# Patient Record
Sex: MALE | Race: OTHER
[De-identification: names, ages, dates, MRNs, and addresses within clinical notes are randomized per-mention and may not be internally consistent; named-entity substitution may affect disease eponyms.]

---

## 2020-06-10 ENCOUNTER — HOSPITAL ENCOUNTER (INPATIENT)
Dept: HOSPITAL 54 - ER | Age: 70
LOS: 14 days | Discharge: NURSING, REHAB OR LONG-TERM CARE | DRG: 870 | End: 2020-06-24
Attending: INTERNAL MEDICINE | Admitting: INTERNAL MEDICINE
Payer: SELF-PAY

## 2020-06-10 VITALS — SYSTOLIC BLOOD PRESSURE: 80 MMHG | DIASTOLIC BLOOD PRESSURE: 33 MMHG

## 2020-06-10 VITALS — DIASTOLIC BLOOD PRESSURE: 38 MMHG | SYSTOLIC BLOOD PRESSURE: 86 MMHG

## 2020-06-10 VITALS — SYSTOLIC BLOOD PRESSURE: 86 MMHG | DIASTOLIC BLOOD PRESSURE: 51 MMHG

## 2020-06-10 VITALS — DIASTOLIC BLOOD PRESSURE: 48 MMHG | SYSTOLIC BLOOD PRESSURE: 88 MMHG

## 2020-06-10 VITALS — WEIGHT: 186 LBS | HEIGHT: 70 IN | BODY MASS INDEX: 26.63 KG/M2

## 2020-06-10 VITALS — SYSTOLIC BLOOD PRESSURE: 95 MMHG | DIASTOLIC BLOOD PRESSURE: 64 MMHG

## 2020-06-10 VITALS — SYSTOLIC BLOOD PRESSURE: 112 MMHG | DIASTOLIC BLOOD PRESSURE: 50 MMHG

## 2020-06-10 DIAGNOSIS — T38.0X5A: ICD-10-CM

## 2020-06-10 DIAGNOSIS — L89.106: ICD-10-CM
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
## 2020-06-10 DIAGNOSIS — E43: ICD-10-CM

## 2020-06-10 DIAGNOSIS — D50.9: ICD-10-CM

## 2020-06-10 DIAGNOSIS — L89.316: ICD-10-CM

## 2020-06-10 DIAGNOSIS — L89.626: ICD-10-CM

## 2020-06-10 DIAGNOSIS — S70.221A: ICD-10-CM

## 2020-06-10 DIAGNOSIS — E87.6: ICD-10-CM

## 2020-06-10 DIAGNOSIS — L89.326: ICD-10-CM

## 2020-06-10 DIAGNOSIS — J98.11: ICD-10-CM

## 2020-06-10 DIAGNOSIS — I87.2: ICD-10-CM

## 2020-06-10 DIAGNOSIS — J96.01: ICD-10-CM

## 2020-06-10 DIAGNOSIS — D63.1: ICD-10-CM

## 2020-06-10 DIAGNOSIS — I89.0: ICD-10-CM

## 2020-06-10 DIAGNOSIS — L89.216: ICD-10-CM

## 2020-06-10 DIAGNOSIS — N17.0: ICD-10-CM

## 2020-06-10 DIAGNOSIS — I87.8: ICD-10-CM

## 2020-06-10 DIAGNOSIS — L89.896: ICD-10-CM

## 2020-06-10 DIAGNOSIS — B96.89: ICD-10-CM

## 2020-06-10 DIAGNOSIS — B95.2: ICD-10-CM

## 2020-06-10 DIAGNOSIS — L89.156: ICD-10-CM

## 2020-06-10 DIAGNOSIS — L89.616: ICD-10-CM

## 2020-06-10 DIAGNOSIS — J18.9: ICD-10-CM

## 2020-06-10 DIAGNOSIS — R65.21: ICD-10-CM
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
## 2020-06-10 DIAGNOSIS — E87.0: ICD-10-CM

## 2020-06-10 DIAGNOSIS — G92: ICD-10-CM

## 2020-06-10 DIAGNOSIS — R23.8: ICD-10-CM

## 2020-06-10 DIAGNOSIS — D63.8: ICD-10-CM

## 2020-06-10 DIAGNOSIS — A41.9: Primary | ICD-10-CM

## 2020-06-10 DIAGNOSIS — E87.2: ICD-10-CM

## 2020-06-10 DIAGNOSIS — N18.9: ICD-10-CM

## 2020-06-10 DIAGNOSIS — J96.02: ICD-10-CM
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
## 2020-06-10 DIAGNOSIS — Z59.0: ICD-10-CM

## 2020-06-10 LAB
ALBUMIN SERPL BCP-MCNC: 2 G/DL (ref 3.4–5)
ALP SERPL-CCNC: 76 U/L (ref 46–116)
ALT SERPL W P-5'-P-CCNC: 19 U/L (ref 12–78)
AST SERPL W P-5'-P-CCNC: 28 U/L (ref 15–37)
BASE EXCESS BLDA CALC-SCNC: -7.3 MMOL/L
BASOPHILS # BLD AUTO: 0 /CMM (ref 0–0.2)
BASOPHILS NFR BLD AUTO: 0.2 % (ref 0–2)
BILIRUB DIRECT SERPL-MCNC: 0.4 MG/DL (ref 0–0.2)
BILIRUB SERPL-MCNC: 0.9 MG/DL (ref 0.2–1)
BILIRUB UR QL STRIP: (no result)
BUN SERPL-MCNC: 43 MG/DL (ref 7–18)
CALCIUM SERPL-MCNC: 8.5 MG/DL (ref 8.5–10.1)
CHLORIDE SERPL-SCNC: 108 MMOL/L (ref 98–107)
CK SERPL-CCNC: 369 U/L (ref 39–308)
CO2 SERPL-SCNC: 20 MMOL/L (ref 21–32)
CREAT SERPL-MCNC: 1.6 MG/DL (ref 0.6–1.3)
CRP SERPL-MCNC: 21.9 MG/DL (ref 0–0.9)
D DIMER PPP FEU-MCNC: 3.2 MG/L(FEU (ref 0.17–0.5)
DO-HGB MFR BLDA: 185.2 MMHG
EOSINOPHIL NFR BLD AUTO: 0 % (ref 0–6)
FERRITIN SERPL-MCNC: 137 NG/ML (ref 8–388)
GLUCOSE SERPL-MCNC: 169 MG/DL (ref 74–106)
HCT VFR BLD AUTO: 25 % (ref 39–51)
HGB BLD-MCNC: 8 G/DL (ref 13.5–17.5)
INHALED O2 CONCENTRATION: 100 %
KETONES UR STRIP-MCNC: 15 MG/DL
LYMPHOCYTES NFR BLD AUTO: 0.4 /CMM (ref 0.8–4.8)
LYMPHOCYTES NFR BLD AUTO: 3.3 % (ref 20–44)
LYMPHOCYTES NFR BLD MANUAL: 2 % (ref 16–48)
MCHC RBC AUTO-ENTMCNC: 31 G/DL (ref 31–36)
MCV RBC AUTO: 70 FL (ref 80–96)
MONOCYTES NFR BLD AUTO: 1.1 /CMM (ref 0.1–1.3)
MONOCYTES NFR BLD AUTO: 9.7 % (ref 2–12)
MONOCYTES NFR BLD MANUAL: 10 % (ref 0–11)
NEUTROPHILS # BLD AUTO: 10.1 /CMM (ref 1.8–8.9)
NEUTROPHILS NFR BLD AUTO: 86.8 % (ref 43–81)
NEUTS BAND NFR BLD MANUAL: 8 % (ref 0–5)
NEUTS SEG NFR BLD MANUAL: 80 % (ref 42–76)
NT-PROBNP SERPL-MCNC: 1833 PG/ML (ref 0–125)
PCO2 TEMP ADJ BLDA: 50.1 MMHG (ref 35–45)
PH TEMP ADJ BLDA: 7.22 [PH] (ref 7.35–7.45)
PH UR STRIP: 5.5 [PH] (ref 5–8)
PLATELET # BLD AUTO: 414 /CMM (ref 150–450)
PO2 TEMP ADJ BLDA: 477.7 MMHG (ref 75–100)
POTASSIUM SERPL-SCNC: 4.1 MMOL/L (ref 3.5–5.1)
PROT SERPL-MCNC: 7.1 G/DL (ref 6.4–8.2)
PROT UR QL STRIP: 100 MG/DL
RBC # BLD AUTO: 3.64 MIL/UL (ref 4.5–6)
RBC #/AREA URNS HPF: (no result) /HPF (ref 0–2)
SAO2 % BLDA: 99.6 % (ref 92–98.5)
SODIUM SERPL-SCNC: 140 MMOL/L (ref 136–145)
UROBILINOGEN UR STRIP-MCNC: 1 EU/DL
VENTILATION MODE VENT: (no result)
WBC #/AREA URNS HPF: (no result) /HPF (ref 0–3)
WBC NRBC COR # BLD AUTO: 11.6 K/UL (ref 4.3–11)

## 2020-06-10 PROCEDURE — G0378 HOSPITAL OBSERVATION PER HR: HCPCS

## 2020-06-10 PROCEDURE — A6403 STERILE GAUZE>16 <= 48 SQ IN: HCPCS

## 2020-06-10 PROCEDURE — A6253 ABSORPT DRG > 48 SQ IN W/O B: HCPCS

## 2020-06-10 PROCEDURE — C1750 CATH, HEMODIALYSIS,LONG-TERM: HCPCS

## 2020-06-10 PROCEDURE — 5A1955Z RESPIRATORY VENTILATION, GREATER THAN 96 CONSECUTIVE HOURS: ICD-10-PCS | Performed by: INTERNAL MEDICINE

## 2020-06-10 PROCEDURE — 0BH17EZ INSERTION OF ENDOTRACHEAL AIRWAY INTO TRACHEA, VIA NATURAL OR ARTIFICIAL OPENING: ICD-10-PCS | Performed by: EMERGENCY MEDICINE

## 2020-06-10 PROCEDURE — C9113 INJ PANTOPRAZOLE SODIUM, VIA: HCPCS

## 2020-06-10 PROCEDURE — A4216 STERILE WATER/SALINE, 10 ML: HCPCS

## 2020-06-10 RX ADMIN — PIPERACILLIN SODIUM AND TAZOBACTAM SODIUM SCH MLS/HR: .375; 3 INJECTION, POWDER, LYOPHILIZED, FOR SOLUTION INTRAVENOUS at 23:23

## 2020-06-10 SDOH — ECONOMIC STABILITY - HOUSING INSECURITY: HOMELESSNESS: Z59.0

## 2020-06-10 NOTE — NUR
RECEIVED PATIENT FROM ER< ORALLY INTUBATED ON AC MODE,sedated on Propofol drip (received  
from ER @ 5 mck/kg/min)responds to pain,slightly moves /wiggles both lower extremities,moves 
both upper extremities,maintain on bilateral soft wrist restraints.

Both lower extremities with diffuse maggot infested wound,edematous with thickened 
skin(greater /more on the left leg leg).CLEANSE both legs with NSS.

Noted a lot of blood underneath the patient when turned,(not sure where the bleeding is 
from,when checked no bleeding noted coming from the rectum,will observe the source of 
bleeding next time it happens.

## 2020-06-10 NOTE — NUR
BIB RA 86 FROM AN ALLEY, DECREASED LOC, HOT TO TOUCH, TACHYPNEIC. PT NON 
VERBAL. WOUND ON EDER LEG LOWER LEG INFECTED & MAGGOTS CRAWLING. PT SEEN & 
EVAL'D BY DR. MADRIGAL. PLACED ON CARDIAC MONITOR. ST. PT INTUBATED & PLACED ON 
VENT. MEDICATED AS ORDERED PER ERMD. WILL CONT TO MONITOR.

## 2020-06-10 NOTE — NUR
RT



rt called to er bed 5 for intubation. pt intubated with size 7.5 ett at 22cm at lip. vent 
settings are: 

AC 16 500 100% +5. no secretions were suctioned via ett. pt has diminished lung sounds. no 
complications during intubation procedure.



abg in 1 hr.



will continue to monitor

## 2020-06-10 NOTE — NUR
PROPOFOL DRIP INITIATED, PT FÁTIMA WELL. PT STABLE, RR EVEN & UNLABORED. NAD NOTED 
AT THIS TIME. WILL CONT TO MONITOR.

## 2020-06-10 NOTE — NUR
RT



pt transported to ct and back. no complications. pt placed back on vent with current 
settings upon return

## 2020-06-11 VITALS — SYSTOLIC BLOOD PRESSURE: 101 MMHG | DIASTOLIC BLOOD PRESSURE: 54 MMHG

## 2020-06-11 VITALS — DIASTOLIC BLOOD PRESSURE: 47 MMHG | SYSTOLIC BLOOD PRESSURE: 93 MMHG

## 2020-06-11 VITALS — SYSTOLIC BLOOD PRESSURE: 90 MMHG | DIASTOLIC BLOOD PRESSURE: 53 MMHG

## 2020-06-11 VITALS — DIASTOLIC BLOOD PRESSURE: 58 MMHG | SYSTOLIC BLOOD PRESSURE: 100 MMHG

## 2020-06-11 VITALS — SYSTOLIC BLOOD PRESSURE: 98 MMHG | DIASTOLIC BLOOD PRESSURE: 54 MMHG

## 2020-06-11 VITALS — DIASTOLIC BLOOD PRESSURE: 54 MMHG | SYSTOLIC BLOOD PRESSURE: 100 MMHG

## 2020-06-11 VITALS — DIASTOLIC BLOOD PRESSURE: 45 MMHG | SYSTOLIC BLOOD PRESSURE: 90 MMHG

## 2020-06-11 VITALS — SYSTOLIC BLOOD PRESSURE: 108 MMHG | DIASTOLIC BLOOD PRESSURE: 54 MMHG

## 2020-06-11 VITALS — SYSTOLIC BLOOD PRESSURE: 102 MMHG | DIASTOLIC BLOOD PRESSURE: 57 MMHG

## 2020-06-11 VITALS — SYSTOLIC BLOOD PRESSURE: 96 MMHG | DIASTOLIC BLOOD PRESSURE: 57 MMHG

## 2020-06-11 VITALS — SYSTOLIC BLOOD PRESSURE: 95 MMHG | DIASTOLIC BLOOD PRESSURE: 52 MMHG

## 2020-06-11 VITALS — SYSTOLIC BLOOD PRESSURE: 105 MMHG | DIASTOLIC BLOOD PRESSURE: 44 MMHG

## 2020-06-11 VITALS — DIASTOLIC BLOOD PRESSURE: 57 MMHG | SYSTOLIC BLOOD PRESSURE: 100 MMHG

## 2020-06-11 VITALS — DIASTOLIC BLOOD PRESSURE: 54 MMHG | SYSTOLIC BLOOD PRESSURE: 103 MMHG

## 2020-06-11 VITALS — DIASTOLIC BLOOD PRESSURE: 58 MMHG | SYSTOLIC BLOOD PRESSURE: 101 MMHG

## 2020-06-11 VITALS — DIASTOLIC BLOOD PRESSURE: 45 MMHG | SYSTOLIC BLOOD PRESSURE: 86 MMHG

## 2020-06-11 VITALS — SYSTOLIC BLOOD PRESSURE: 99 MMHG | DIASTOLIC BLOOD PRESSURE: 55 MMHG

## 2020-06-11 VITALS — DIASTOLIC BLOOD PRESSURE: 49 MMHG | SYSTOLIC BLOOD PRESSURE: 105 MMHG

## 2020-06-11 VITALS — SYSTOLIC BLOOD PRESSURE: 98 MMHG | DIASTOLIC BLOOD PRESSURE: 56 MMHG

## 2020-06-11 VITALS — DIASTOLIC BLOOD PRESSURE: 56 MMHG | SYSTOLIC BLOOD PRESSURE: 104 MMHG

## 2020-06-11 VITALS — SYSTOLIC BLOOD PRESSURE: 110 MMHG | DIASTOLIC BLOOD PRESSURE: 55 MMHG

## 2020-06-11 VITALS — SYSTOLIC BLOOD PRESSURE: 98 MMHG | DIASTOLIC BLOOD PRESSURE: 51 MMHG

## 2020-06-11 VITALS — DIASTOLIC BLOOD PRESSURE: 55 MMHG | SYSTOLIC BLOOD PRESSURE: 95 MMHG

## 2020-06-11 VITALS — SYSTOLIC BLOOD PRESSURE: 101 MMHG | DIASTOLIC BLOOD PRESSURE: 52 MMHG

## 2020-06-11 VITALS — SYSTOLIC BLOOD PRESSURE: 94 MMHG | DIASTOLIC BLOOD PRESSURE: 54 MMHG

## 2020-06-11 VITALS — SYSTOLIC BLOOD PRESSURE: 90 MMHG | DIASTOLIC BLOOD PRESSURE: 48 MMHG

## 2020-06-11 VITALS — DIASTOLIC BLOOD PRESSURE: 45 MMHG | SYSTOLIC BLOOD PRESSURE: 81 MMHG

## 2020-06-11 VITALS — DIASTOLIC BLOOD PRESSURE: 54 MMHG | SYSTOLIC BLOOD PRESSURE: 101 MMHG

## 2020-06-11 VITALS — DIASTOLIC BLOOD PRESSURE: 58 MMHG | SYSTOLIC BLOOD PRESSURE: 102 MMHG

## 2020-06-11 VITALS — DIASTOLIC BLOOD PRESSURE: 54 MMHG | SYSTOLIC BLOOD PRESSURE: 98 MMHG

## 2020-06-11 VITALS — DIASTOLIC BLOOD PRESSURE: 60 MMHG | SYSTOLIC BLOOD PRESSURE: 109 MMHG

## 2020-06-11 VITALS — DIASTOLIC BLOOD PRESSURE: 60 MMHG | SYSTOLIC BLOOD PRESSURE: 117 MMHG

## 2020-06-11 VITALS — SYSTOLIC BLOOD PRESSURE: 99 MMHG | DIASTOLIC BLOOD PRESSURE: 53 MMHG

## 2020-06-11 VITALS — DIASTOLIC BLOOD PRESSURE: 45 MMHG | SYSTOLIC BLOOD PRESSURE: 85 MMHG

## 2020-06-11 VITALS — SYSTOLIC BLOOD PRESSURE: 102 MMHG | DIASTOLIC BLOOD PRESSURE: 58 MMHG

## 2020-06-11 VITALS — DIASTOLIC BLOOD PRESSURE: 56 MMHG | SYSTOLIC BLOOD PRESSURE: 98 MMHG

## 2020-06-11 VITALS — SYSTOLIC BLOOD PRESSURE: 116 MMHG | DIASTOLIC BLOOD PRESSURE: 60 MMHG

## 2020-06-11 VITALS — SYSTOLIC BLOOD PRESSURE: 99 MMHG | DIASTOLIC BLOOD PRESSURE: 47 MMHG

## 2020-06-11 VITALS — SYSTOLIC BLOOD PRESSURE: 81 MMHG | DIASTOLIC BLOOD PRESSURE: 41 MMHG

## 2020-06-11 VITALS — DIASTOLIC BLOOD PRESSURE: 56 MMHG | SYSTOLIC BLOOD PRESSURE: 101 MMHG

## 2020-06-11 VITALS — DIASTOLIC BLOOD PRESSURE: 56 MMHG | SYSTOLIC BLOOD PRESSURE: 108 MMHG

## 2020-06-11 VITALS — SYSTOLIC BLOOD PRESSURE: 93 MMHG | DIASTOLIC BLOOD PRESSURE: 54 MMHG

## 2020-06-11 VITALS — SYSTOLIC BLOOD PRESSURE: 102 MMHG | DIASTOLIC BLOOD PRESSURE: 56 MMHG

## 2020-06-11 VITALS — DIASTOLIC BLOOD PRESSURE: 53 MMHG | SYSTOLIC BLOOD PRESSURE: 99 MMHG

## 2020-06-11 VITALS — SYSTOLIC BLOOD PRESSURE: 109 MMHG | DIASTOLIC BLOOD PRESSURE: 62 MMHG

## 2020-06-11 VITALS — DIASTOLIC BLOOD PRESSURE: 49 MMHG | SYSTOLIC BLOOD PRESSURE: 94 MMHG

## 2020-06-11 VITALS — SYSTOLIC BLOOD PRESSURE: 115 MMHG | DIASTOLIC BLOOD PRESSURE: 59 MMHG

## 2020-06-11 VITALS — SYSTOLIC BLOOD PRESSURE: 101 MMHG | DIASTOLIC BLOOD PRESSURE: 58 MMHG

## 2020-06-11 VITALS — SYSTOLIC BLOOD PRESSURE: 100 MMHG | DIASTOLIC BLOOD PRESSURE: 55 MMHG

## 2020-06-11 VITALS — SYSTOLIC BLOOD PRESSURE: 121 MMHG | DIASTOLIC BLOOD PRESSURE: 61 MMHG

## 2020-06-11 VITALS — SYSTOLIC BLOOD PRESSURE: 95 MMHG | DIASTOLIC BLOOD PRESSURE: 47 MMHG

## 2020-06-11 VITALS — SYSTOLIC BLOOD PRESSURE: 99 MMHG | DIASTOLIC BLOOD PRESSURE: 52 MMHG

## 2020-06-11 VITALS — DIASTOLIC BLOOD PRESSURE: 55 MMHG | SYSTOLIC BLOOD PRESSURE: 92 MMHG

## 2020-06-11 VITALS — SYSTOLIC BLOOD PRESSURE: 96 MMHG | DIASTOLIC BLOOD PRESSURE: 53 MMHG

## 2020-06-11 VITALS — DIASTOLIC BLOOD PRESSURE: 51 MMHG | SYSTOLIC BLOOD PRESSURE: 97 MMHG

## 2020-06-11 VITALS — SYSTOLIC BLOOD PRESSURE: 98 MMHG | DIASTOLIC BLOOD PRESSURE: 55 MMHG

## 2020-06-11 VITALS — DIASTOLIC BLOOD PRESSURE: 72 MMHG | SYSTOLIC BLOOD PRESSURE: 109 MMHG

## 2020-06-11 VITALS — SYSTOLIC BLOOD PRESSURE: 98 MMHG | DIASTOLIC BLOOD PRESSURE: 57 MMHG

## 2020-06-11 VITALS — SYSTOLIC BLOOD PRESSURE: 107 MMHG | DIASTOLIC BLOOD PRESSURE: 49 MMHG

## 2020-06-11 VITALS — DIASTOLIC BLOOD PRESSURE: 56 MMHG | SYSTOLIC BLOOD PRESSURE: 96 MMHG

## 2020-06-11 VITALS — DIASTOLIC BLOOD PRESSURE: 52 MMHG | SYSTOLIC BLOOD PRESSURE: 90 MMHG

## 2020-06-11 VITALS — SYSTOLIC BLOOD PRESSURE: 104 MMHG | DIASTOLIC BLOOD PRESSURE: 54 MMHG

## 2020-06-11 VITALS — SYSTOLIC BLOOD PRESSURE: 100 MMHG | DIASTOLIC BLOOD PRESSURE: 51 MMHG

## 2020-06-11 VITALS — SYSTOLIC BLOOD PRESSURE: 110 MMHG | DIASTOLIC BLOOD PRESSURE: 60 MMHG

## 2020-06-11 VITALS — SYSTOLIC BLOOD PRESSURE: 98 MMHG | DIASTOLIC BLOOD PRESSURE: 48 MMHG

## 2020-06-11 VITALS — SYSTOLIC BLOOD PRESSURE: 104 MMHG | DIASTOLIC BLOOD PRESSURE: 52 MMHG

## 2020-06-11 VITALS — DIASTOLIC BLOOD PRESSURE: 55 MMHG | SYSTOLIC BLOOD PRESSURE: 97 MMHG

## 2020-06-11 VITALS — SYSTOLIC BLOOD PRESSURE: 103 MMHG | DIASTOLIC BLOOD PRESSURE: 53 MMHG

## 2020-06-11 VITALS — DIASTOLIC BLOOD PRESSURE: 51 MMHG | SYSTOLIC BLOOD PRESSURE: 96 MMHG

## 2020-06-11 VITALS — DIASTOLIC BLOOD PRESSURE: 49 MMHG | SYSTOLIC BLOOD PRESSURE: 103 MMHG

## 2020-06-11 VITALS — DIASTOLIC BLOOD PRESSURE: 49 MMHG | SYSTOLIC BLOOD PRESSURE: 96 MMHG

## 2020-06-11 VITALS — DIASTOLIC BLOOD PRESSURE: 50 MMHG | SYSTOLIC BLOOD PRESSURE: 93 MMHG

## 2020-06-11 VITALS — SYSTOLIC BLOOD PRESSURE: 112 MMHG | DIASTOLIC BLOOD PRESSURE: 60 MMHG

## 2020-06-11 VITALS — DIASTOLIC BLOOD PRESSURE: 52 MMHG | SYSTOLIC BLOOD PRESSURE: 94 MMHG

## 2020-06-11 VITALS — DIASTOLIC BLOOD PRESSURE: 53 MMHG | SYSTOLIC BLOOD PRESSURE: 91 MMHG

## 2020-06-11 VITALS — SYSTOLIC BLOOD PRESSURE: 88 MMHG | DIASTOLIC BLOOD PRESSURE: 52 MMHG

## 2020-06-11 VITALS — DIASTOLIC BLOOD PRESSURE: 56 MMHG | SYSTOLIC BLOOD PRESSURE: 102 MMHG

## 2020-06-11 VITALS — DIASTOLIC BLOOD PRESSURE: 53 MMHG | SYSTOLIC BLOOD PRESSURE: 100 MMHG

## 2020-06-11 VITALS — SYSTOLIC BLOOD PRESSURE: 98 MMHG | DIASTOLIC BLOOD PRESSURE: 53 MMHG

## 2020-06-11 LAB
ALBUMIN SERPL BCP-MCNC: 1.6 G/DL (ref 3.4–5)
ALP SERPL-CCNC: 62 U/L (ref 46–116)
ALT SERPL W P-5'-P-CCNC: 19 U/L (ref 12–78)
APPEARANCE UR: (no result)
AST SERPL W P-5'-P-CCNC: 32 U/L (ref 15–37)
BASE EXCESS BLDA CALC-SCNC: -5.1 MMOL/L
BASOPHILS # BLD AUTO: 0 /CMM (ref 0–0.2)
BASOPHILS NFR BLD AUTO: 0.1 % (ref 0–2)
BILIRUB SERPL-MCNC: 0.5 MG/DL (ref 0.2–1)
BILIRUB UR QL STRIP: NEGATIVE
BUN SERPL-MCNC: 45 MG/DL (ref 7–18)
CALCIUM SERPL-MCNC: 7.4 MG/DL (ref 8.5–10.1)
CHLORIDE SERPL-SCNC: 111 MMOL/L (ref 98–107)
CO2 SERPL-SCNC: 20 MMOL/L (ref 21–32)
COLOR UR: YELLOW
CREAT SERPL-MCNC: 1.7 MG/DL (ref 0.6–1.3)
CREAT UR-MCNC: 105.4 MG/DL (ref 30–125)
CRP SERPL-MCNC: 30.7 MG/DL (ref 0–0.9)
DEPRECATED SQUAMOUS URNS QL MICRO: (no result) /HPF
DO-HGB MFR BLDA: 77.1 MMHG
EOSINOPHIL NFR BLD AUTO: 0 % (ref 0–6)
FERRITIN SERPL-MCNC: 156 NG/ML (ref 8–388)
GLUCOSE SERPL-MCNC: 148 MG/DL (ref 74–106)
GLUCOSE UR STRIP-MCNC: NEGATIVE MG/DL
HCT VFR BLD AUTO: 22 % (ref 39–51)
HGB BLD-MCNC: 6.8 G/DL (ref 13.5–17.5)
HGB UR QL STRIP: (no result) ERY/UL
INHALED O2 CONCENTRATION: 60 %
INTRINSIC PEEP RESPIRATORY: 5 CM H2O
IRON SERPL-MCNC: 3 UG/DL (ref 50–175)
KETONES UR STRIP-MCNC: NEGATIVE MG/DL
LEUKOCYTE ESTERASE UR QL STRIP: NEGATIVE
LYMPHOCYTES NFR BLD AUTO: 1 /CMM (ref 0.8–4.8)
LYMPHOCYTES NFR BLD AUTO: 6.4 % (ref 20–44)
LYMPHOCYTES NFR BLD MANUAL: 10 % (ref 16–48)
MAGNESIUM SERPL-MCNC: 2.5 MG/DL (ref 1.8–2.4)
MCHC RBC AUTO-ENTMCNC: 31 G/DL (ref 31–36)
MCV RBC AUTO: 71 FL (ref 80–96)
MONOCYTES NFR BLD AUTO: 15.5 % (ref 2–12)
MONOCYTES NFR BLD AUTO: 2.4 /CMM (ref 0.1–1.3)
MONOCYTES NFR BLD MANUAL: 7 % (ref 0–11)
NEUTROPHILS # BLD AUTO: 11.8 /CMM (ref 1.8–8.9)
NEUTROPHILS NFR BLD AUTO: 78 % (ref 43–81)
NEUTS SEG NFR BLD MANUAL: 83 % (ref 42–76)
NITRITE UR QL STRIP: NEGATIVE
PCO2 TEMP ADJ BLDA: 28.3 MMHG (ref 35–45)
PEEP SETTING VENT: 500 ML
PH TEMP ADJ BLDA: 7.43 [PH] (ref 7.35–7.45)
PH UR STRIP: 5.5 [PH] (ref 5–8)
PHOSPHATE SERPL-MCNC: 5 MG/DL (ref 2.5–4.9)
PLATELET # BLD AUTO: 344 /CMM (ref 150–450)
PO2 TEMP ADJ BLDA: 319.6 MMHG (ref 75–100)
POTASSIUM SERPL-SCNC: 3.9 MMOL/L (ref 3.5–5.1)
PROT SERPL-MCNC: 6 G/DL (ref 6.4–8.2)
PROT UR QL STRIP: (no result) MG/DL
PROT UR-MCNC: 108.4 MG/DL (ref 0–11.9)
RBC # BLD AUTO: 3.07 MIL/UL (ref 4.5–6)
RBC #/AREA URNS HPF: (no result) /HPF (ref 0–2)
SAO2 % BLDA: 100 % (ref 92–98.5)
SET RATE, BG: 20
SODIUM SERPL-SCNC: 143 MMOL/L (ref 136–145)
SODIUM UR-SCNC: 24 MMOL/L (ref 40–220)
TIBC SERPL-MCNC: 158 UG/DL (ref 250–450)
UROBILINOGEN UR STRIP-MCNC: 0.2 EU/DL
VENTILATION MODE VENT: (no result)
WBC #/AREA URNS HPF: (no result) /HPF
WBC #/AREA URNS HPF: (no result) /HPF (ref 0–3)
WBC NRBC COR # BLD AUTO: 15.2 K/UL (ref 4.3–11)

## 2020-06-11 PROCEDURE — 30233N1 TRANSFUSION OF NONAUTOLOGOUS RED BLOOD CELLS INTO PERIPHERAL VEIN, PERCUTANEOUS APPROACH: ICD-10-PCS | Performed by: INTERNAL MEDICINE

## 2020-06-11 RX ADMIN — SODIUM CHLORIDE SCH MLS/HR: 9 INJECTION, SOLUTION INTRAVENOUS at 14:55

## 2020-06-11 RX ADMIN — HYDROCORTISONE SODIUM SUCCINATE SCH MG: 100 INJECTION, POWDER, FOR SOLUTION INTRAMUSCULAR; INTRAVASCULAR at 13:05

## 2020-06-11 RX ADMIN — PROPOFOL PRN MLS/HR: 10 INJECTION, EMULSION INTRAVENOUS at 11:06

## 2020-06-11 RX ADMIN — PROPOFOL PRN MLS/HR: 10 INJECTION, EMULSION INTRAVENOUS at 05:57

## 2020-06-11 RX ADMIN — HYDROCORTISONE SODIUM SUCCINATE SCH MG: 100 INJECTION, POWDER, FOR SOLUTION INTRAMUSCULAR; INTRAVASCULAR at 09:19

## 2020-06-11 RX ADMIN — DEXTROSE MONOHYDRATE SCH MLS/HR: 50 INJECTION, SOLUTION INTRAVENOUS at 09:19

## 2020-06-11 RX ADMIN — PROPOFOL PRN MLS/HR: 10 INJECTION, EMULSION INTRAVENOUS at 16:17

## 2020-06-11 RX ADMIN — DEXTROSE MONOHYDRATE SCH MLS/HR: 50 INJECTION, SOLUTION INTRAVENOUS at 20:18

## 2020-06-11 RX ADMIN — PIPERACILLIN SODIUM AND TAZOBACTAM SODIUM SCH MLS/HR: .375; 3 INJECTION, POWDER, LYOPHILIZED, FOR SOLUTION INTRAVENOUS at 10:56

## 2020-06-11 RX ADMIN — SODIUM CHLORIDE SCH MG: 9 INJECTION, SOLUTION INTRAVENOUS at 13:05

## 2020-06-11 RX ADMIN — PIPERACILLIN SODIUM AND TAZOBACTAM SODIUM SCH MLS/HR: .375; 3 INJECTION, POWDER, LYOPHILIZED, FOR SOLUTION INTRAVENOUS at 05:38

## 2020-06-11 RX ADMIN — DEXTROSE AND SODIUM CHLORIDE PRN MLS/HR: 5; 900 INJECTION, SOLUTION INTRAVENOUS at 21:30

## 2020-06-11 RX ADMIN — HYDROCORTISONE SODIUM SUCCINATE SCH MG: 100 INJECTION, POWDER, FOR SOLUTION INTRAMUSCULAR; INTRAVASCULAR at 18:47

## 2020-06-11 RX ADMIN — PIPERACILLIN SODIUM AND TAZOBACTAM SODIUM SCH MLS/HR: .375; 3 INJECTION, POWDER, LYOPHILIZED, FOR SOLUTION INTRAVENOUS at 18:47

## 2020-06-11 RX ADMIN — PROPOFOL PRN MLS/HR: 10 INJECTION, EMULSION INTRAVENOUS at 00:22

## 2020-06-11 RX ADMIN — PROPOFOL PRN MLS/HR: 10 INJECTION, EMULSION INTRAVENOUS at 19:59

## 2020-06-11 RX ADMIN — SODIUM CHLORIDE SCH MG: 9 INJECTION, SOLUTION INTRAVENOUS at 20:25

## 2020-06-11 NOTE — NUR
Awakens on and off getting agitated,kicking legs,trying to get off restraints.Will titrate 
Propofol up as needed.Levophed order obtained from Dr. [place] for BP support.

## 2020-06-11 NOTE — NUR
consult was requested during the case management meeting due to the pts 
homelessness and lack of identification. Pt is a 67 year old male who was admitted when he 
collapsed on the street. Per pts nurse, Tamanna, MARCIANO cannot communicate with the pt at this 
time due to the pt being intubated and sedated. MARCIANO was unable to receive any information for 
this pt at this time. MARCIANO will follow up.

## 2020-06-11 NOTE — NUR
END OF SHIFT NOTE:  PT HAD A FAIRLY UNEVENTFUL SHIFT.  LEVOPHED DRIP TITRATED OFF AT 1100 
PER TITRATION ORDERS.  PT RECEIVED 1 UNIT PRBCS PER MD ORDERS.  WOUND CARE NURSE WAS NOT 
HERE TODAY TO ASSESS PT'S WOUNDS AND HOW TO GET RID OF MAGGOTS IN PATIENT LEG WOUNDS.  TITI CERDA FROM WOUND CARE CLINIC WAS HERE TODAY AND STATED THAT THE PODIATRIST HAS BEEN CONSULTED 
AND WILL ASSESS PATIENT AS TO HOW TO GET RID OF THE MAGGOTS AND TREAT WOUNDS.  DR. CATRACHITA DIXON NOTIFED AFTER RN REMOVED DRESSING ON LEFT LEG THAT THERE ARE HUNDREDS IF NOT 
THOUSANDS OF MAGGOTS IN AND OUT OF THE WOUND ON LEFT LET AND FOOT.  DRESSING REMOVED, LEG 
WRAPPED IN CHUCKS PADS TO HELP WICK AWAY MOISTURE.  SEE WOUND PICTURES.  PT CHECKED ON 
HOURLY AND PRN BY NURSING STAFF.

## 2020-06-11 NOTE — NUR
ICU RN NOTE



RECEIVED PATIENT IN BED RESTING WITH HOB ELEVATED. BREATHING EVEN AND NON LABORED. VENT 
DEPENDANT. SEDATED, ON DIPRIVAN RUNNING AT 40 MCG/KG/MIN. RESPONSIVE TO LOCALIZED PAIN. IV 
SITES ON RFA GAUGE 18 AND LAC GAUGE 18, BOTH PATENT. ON CLEMENT CATH, URINE IS CLEAR AND 
YELLOW IN COLOR. PATIENT IS ON BILATERAL SOFT WRIST RESTRAINS. WOUND DRESSINGS ON BILATERAL 
LOWER EXTREMITIES CLEAN AND DRY. IN NO APPARENT DISTRESS NOTED AT THIS TIME. WILL CONTINUE 
TO MONITOR.

## 2020-06-11 NOTE — NUR
No change ,stable, On Levophed @ 0.1 mcg/kg/min for BP support.Still on Propopfol drip.No 
further bleeding noted all night.

## 2020-06-12 VITALS — SYSTOLIC BLOOD PRESSURE: 99 MMHG | DIASTOLIC BLOOD PRESSURE: 55 MMHG

## 2020-06-12 VITALS — DIASTOLIC BLOOD PRESSURE: 64 MMHG | SYSTOLIC BLOOD PRESSURE: 103 MMHG

## 2020-06-12 VITALS — DIASTOLIC BLOOD PRESSURE: 64 MMHG | SYSTOLIC BLOOD PRESSURE: 102 MMHG

## 2020-06-12 VITALS — SYSTOLIC BLOOD PRESSURE: 101 MMHG | DIASTOLIC BLOOD PRESSURE: 54 MMHG

## 2020-06-12 VITALS — SYSTOLIC BLOOD PRESSURE: 91 MMHG | DIASTOLIC BLOOD PRESSURE: 60 MMHG

## 2020-06-12 VITALS — DIASTOLIC BLOOD PRESSURE: 58 MMHG | SYSTOLIC BLOOD PRESSURE: 103 MMHG

## 2020-06-12 VITALS — DIASTOLIC BLOOD PRESSURE: 63 MMHG | SYSTOLIC BLOOD PRESSURE: 102 MMHG

## 2020-06-12 VITALS — DIASTOLIC BLOOD PRESSURE: 64 MMHG | SYSTOLIC BLOOD PRESSURE: 104 MMHG

## 2020-06-12 VITALS — DIASTOLIC BLOOD PRESSURE: 55 MMHG | SYSTOLIC BLOOD PRESSURE: 100 MMHG

## 2020-06-12 VITALS — DIASTOLIC BLOOD PRESSURE: 56 MMHG | SYSTOLIC BLOOD PRESSURE: 96 MMHG

## 2020-06-12 VITALS — SYSTOLIC BLOOD PRESSURE: 114 MMHG | DIASTOLIC BLOOD PRESSURE: 67 MMHG

## 2020-06-12 VITALS — DIASTOLIC BLOOD PRESSURE: 57 MMHG | SYSTOLIC BLOOD PRESSURE: 103 MMHG

## 2020-06-12 VITALS — SYSTOLIC BLOOD PRESSURE: 100 MMHG | DIASTOLIC BLOOD PRESSURE: 62 MMHG

## 2020-06-12 VITALS — DIASTOLIC BLOOD PRESSURE: 65 MMHG | SYSTOLIC BLOOD PRESSURE: 104 MMHG

## 2020-06-12 VITALS — SYSTOLIC BLOOD PRESSURE: 99 MMHG | DIASTOLIC BLOOD PRESSURE: 54 MMHG

## 2020-06-12 VITALS — DIASTOLIC BLOOD PRESSURE: 58 MMHG | SYSTOLIC BLOOD PRESSURE: 95 MMHG

## 2020-06-12 VITALS — SYSTOLIC BLOOD PRESSURE: 104 MMHG | DIASTOLIC BLOOD PRESSURE: 60 MMHG

## 2020-06-12 VITALS — DIASTOLIC BLOOD PRESSURE: 57 MMHG | SYSTOLIC BLOOD PRESSURE: 100 MMHG

## 2020-06-12 VITALS — DIASTOLIC BLOOD PRESSURE: 55 MMHG | SYSTOLIC BLOOD PRESSURE: 99 MMHG

## 2020-06-12 VITALS — DIASTOLIC BLOOD PRESSURE: 53 MMHG | SYSTOLIC BLOOD PRESSURE: 100 MMHG

## 2020-06-12 VITALS — DIASTOLIC BLOOD PRESSURE: 63 MMHG | SYSTOLIC BLOOD PRESSURE: 105 MMHG

## 2020-06-12 VITALS — DIASTOLIC BLOOD PRESSURE: 54 MMHG | SYSTOLIC BLOOD PRESSURE: 100 MMHG

## 2020-06-12 VITALS — SYSTOLIC BLOOD PRESSURE: 108 MMHG | DIASTOLIC BLOOD PRESSURE: 64 MMHG

## 2020-06-12 VITALS — SYSTOLIC BLOOD PRESSURE: 133 MMHG | DIASTOLIC BLOOD PRESSURE: 84 MMHG

## 2020-06-12 VITALS — SYSTOLIC BLOOD PRESSURE: 101 MMHG | DIASTOLIC BLOOD PRESSURE: 55 MMHG

## 2020-06-12 VITALS — SYSTOLIC BLOOD PRESSURE: 105 MMHG | DIASTOLIC BLOOD PRESSURE: 62 MMHG

## 2020-06-12 VITALS — DIASTOLIC BLOOD PRESSURE: 63 MMHG | SYSTOLIC BLOOD PRESSURE: 103 MMHG

## 2020-06-12 VITALS — SYSTOLIC BLOOD PRESSURE: 98 MMHG | DIASTOLIC BLOOD PRESSURE: 54 MMHG

## 2020-06-12 VITALS — DIASTOLIC BLOOD PRESSURE: 59 MMHG | SYSTOLIC BLOOD PRESSURE: 103 MMHG

## 2020-06-12 VITALS — DIASTOLIC BLOOD PRESSURE: 59 MMHG | SYSTOLIC BLOOD PRESSURE: 97 MMHG

## 2020-06-12 VITALS — DIASTOLIC BLOOD PRESSURE: 56 MMHG | SYSTOLIC BLOOD PRESSURE: 102 MMHG

## 2020-06-12 VITALS — DIASTOLIC BLOOD PRESSURE: 52 MMHG | SYSTOLIC BLOOD PRESSURE: 99 MMHG

## 2020-06-12 VITALS — SYSTOLIC BLOOD PRESSURE: 104 MMHG | DIASTOLIC BLOOD PRESSURE: 61 MMHG

## 2020-06-12 VITALS — DIASTOLIC BLOOD PRESSURE: 56 MMHG | SYSTOLIC BLOOD PRESSURE: 104 MMHG

## 2020-06-12 VITALS — DIASTOLIC BLOOD PRESSURE: 66 MMHG | SYSTOLIC BLOOD PRESSURE: 106 MMHG

## 2020-06-12 VITALS — DIASTOLIC BLOOD PRESSURE: 52 MMHG | SYSTOLIC BLOOD PRESSURE: 93 MMHG

## 2020-06-12 VITALS — DIASTOLIC BLOOD PRESSURE: 67 MMHG | SYSTOLIC BLOOD PRESSURE: 113 MMHG

## 2020-06-12 VITALS — DIASTOLIC BLOOD PRESSURE: 57 MMHG | SYSTOLIC BLOOD PRESSURE: 102 MMHG

## 2020-06-12 VITALS — DIASTOLIC BLOOD PRESSURE: 51 MMHG | SYSTOLIC BLOOD PRESSURE: 100 MMHG

## 2020-06-12 VITALS — DIASTOLIC BLOOD PRESSURE: 57 MMHG | SYSTOLIC BLOOD PRESSURE: 96 MMHG

## 2020-06-12 VITALS — SYSTOLIC BLOOD PRESSURE: 105 MMHG | DIASTOLIC BLOOD PRESSURE: 61 MMHG

## 2020-06-12 VITALS — DIASTOLIC BLOOD PRESSURE: 57 MMHG | SYSTOLIC BLOOD PRESSURE: 101 MMHG

## 2020-06-12 LAB
ALBUMIN SERPL BCP-MCNC: 1.4 G/DL (ref 3.4–5)
ALBUMIN SERPL ELPH-MCNC: 1.8 G/DL (ref 2.9–4.4)
ALBUMIN/GLOB SERPL: 0.5 {RATIO} (ref 0.7–1.7)
ALP SERPL-CCNC: 62 U/L (ref 46–116)
ALPHA1 GLOB SERPL ELPH-MCNC: 0.4 G/DL (ref 0–0.4)
ALPHA2 GLOB SERPL ELPH-MCNC: 1 G/DL (ref 0.4–1)
ALT SERPL W P-5'-P-CCNC: 17 U/L (ref 12–78)
AST SERPL W P-5'-P-CCNC: 30 U/L (ref 15–37)
B-GLOBULIN SERPL ELPH-MCNC: 0.7 G/DL (ref 0.7–1.3)
BASE EXCESS BLDA CALC-SCNC: -5 MMOL/L
BASOPHILS # BLD AUTO: 0 /CMM (ref 0–0.2)
BASOPHILS NFR BLD AUTO: 0.1 % (ref 0–2)
BILIRUB SERPL-MCNC: 0.3 MG/DL (ref 0.2–1)
BUN SERPL-MCNC: 31 MG/DL (ref 7–18)
CALCIUM SERPL-MCNC: 7.1 MG/DL (ref 8.5–10.1)
CHLORIDE SERPL-SCNC: 113 MMOL/L (ref 98–107)
CK SERPL-CCNC: 629 U/L (ref 39–308)
CO2 SERPL-SCNC: 19 MMOL/L (ref 21–32)
CREAT SERPL-MCNC: 1.4 MG/DL (ref 0.6–1.3)
CRP SERPL-MCNC: 16.3 MG/DL (ref 0–0.9)
DO-HGB MFR BLDA: 54 MMHG
EOSINOPHIL NFR BLD AUTO: 0 % (ref 0–6)
GAMMA GLOB SERPL ELPH-MCNC: 1.6 G/DL (ref 0.4–1.8)
GLOBULIN SER CALC-MCNC: 3.7 G/DL (ref 2.2–3.9)
GLUCOSE SERPL-MCNC: 216 MG/DL (ref 74–106)
HCT VFR BLD AUTO: 21 % (ref 39–51)
HGB BLD-MCNC: 6.6 G/DL (ref 13.5–17.5)
INHALED O2 CONCENTRATION: 40 %
LYMPHOCYTES NFR BLD AUTO: 0.6 /CMM (ref 0.8–4.8)
LYMPHOCYTES NFR BLD AUTO: 5.8 % (ref 20–44)
LYMPHOCYTES NFR BLD MANUAL: 6 % (ref 16–48)
MAGNESIUM SERPL-MCNC: 2.6 MG/DL (ref 1.8–2.4)
MCHC RBC AUTO-ENTMCNC: 32 G/DL (ref 31–36)
MCV RBC AUTO: 72 FL (ref 80–96)
MONOCYTES NFR BLD AUTO: 0.8 /CMM (ref 0.1–1.3)
MONOCYTES NFR BLD AUTO: 7.6 % (ref 2–12)
MONOCYTES NFR BLD MANUAL: 5 % (ref 0–11)
NEUTROPHILS # BLD AUTO: 8.9 /CMM (ref 1.8–8.9)
NEUTROPHILS NFR BLD AUTO: 86.5 % (ref 43–81)
NEUTS SEG NFR BLD MANUAL: 89 % (ref 42–76)
PCO2 TEMP ADJ BLDA: 29.4 MMHG (ref 35–45)
PH TEMP ADJ BLDA: 7.42 [PH] (ref 7.35–7.45)
PHOSPHATE SERPL-MCNC: 3.2 MG/DL (ref 2.5–4.9)
PLATELET # BLD AUTO: 268 /CMM (ref 150–450)
PO2 TEMP ADJ BLDA: 197.4 MMHG (ref 75–100)
POTASSIUM SERPL-SCNC: 4 MMOL/L (ref 3.5–5.1)
PROT SERPL-MCNC: 5.9 G/DL (ref 6.4–8.2)
RBC # BLD AUTO: 2.92 MIL/UL (ref 4.5–6)
SAO2 % BLDA: 99.7 % (ref 92–98.5)
SODIUM SERPL-SCNC: 142 MMOL/L (ref 136–145)
VENTILATION MODE VENT: (no result)
WBC NRBC COR # BLD AUTO: 10.3 K/UL (ref 4.3–11)

## 2020-06-12 PROCEDURE — 02HV33Z INSERTION OF INFUSION DEVICE INTO SUPERIOR VENA CAVA, PERCUTANEOUS APPROACH: ICD-10-PCS | Performed by: NURSE PRACTITIONER

## 2020-06-12 PROCEDURE — B548ZZA ULTRASONOGRAPHY OF SUPERIOR VENA CAVA, GUIDANCE: ICD-10-PCS | Performed by: NURSE PRACTITIONER

## 2020-06-12 RX ADMIN — PROPOFOL PRN MLS/HR: 10 INJECTION, EMULSION INTRAVENOUS at 11:01

## 2020-06-12 RX ADMIN — PIPERACILLIN SODIUM AND TAZOBACTAM SODIUM SCH MLS/HR: .375; 3 INJECTION, POWDER, LYOPHILIZED, FOR SOLUTION INTRAVENOUS at 02:23

## 2020-06-12 RX ADMIN — PIPERACILLIN SODIUM AND TAZOBACTAM SODIUM SCH MLS/HR: .375; 3 INJECTION, POWDER, LYOPHILIZED, FOR SOLUTION INTRAVENOUS at 11:32

## 2020-06-12 RX ADMIN — PROPOFOL PRN MLS/HR: 10 INJECTION, EMULSION INTRAVENOUS at 22:49

## 2020-06-12 RX ADMIN — DEXTROSE MONOHYDRATE SCH MLS/HR: 50 INJECTION, SOLUTION INTRAVENOUS at 09:38

## 2020-06-12 RX ADMIN — SODIUM CHLORIDE SCH MG: 9 INJECTION, SOLUTION INTRAVENOUS at 09:38

## 2020-06-12 RX ADMIN — PROPOFOL PRN MLS/HR: 10 INJECTION, EMULSION INTRAVENOUS at 18:28

## 2020-06-12 RX ADMIN — HYDROCORTISONE SODIUM SUCCINATE SCH MG: 100 INJECTION, POWDER, FOR SOLUTION INTRAMUSCULAR; INTRAVASCULAR at 20:04

## 2020-06-12 RX ADMIN — PROPOFOL PRN MLS/HR: 10 INJECTION, EMULSION INTRAVENOUS at 06:05

## 2020-06-12 RX ADMIN — HYDROCORTISONE SODIUM SUCCINATE SCH MG: 100 INJECTION, POWDER, FOR SOLUTION INTRAMUSCULAR; INTRAVASCULAR at 09:38

## 2020-06-12 RX ADMIN — DEXTROSE MONOHYDRATE SCH MLS/HR: 50 INJECTION, SOLUTION INTRAVENOUS at 20:01

## 2020-06-12 RX ADMIN — PROPOFOL PRN MLS/HR: 10 INJECTION, EMULSION INTRAVENOUS at 14:05

## 2020-06-12 RX ADMIN — SODIUM CHLORIDE SCH MG: 9 INJECTION, SOLUTION INTRAVENOUS at 20:03

## 2020-06-12 RX ADMIN — SODIUM CHLORIDE SCH MLS/HR: 9 INJECTION, SOLUTION INTRAVENOUS at 16:27

## 2020-06-12 RX ADMIN — PROPOFOL PRN MLS/HR: 10 INJECTION, EMULSION INTRAVENOUS at 01:00

## 2020-06-12 RX ADMIN — DEXTROSE AND SODIUM CHLORIDE PRN MLS/HR: 5; 900 INJECTION, SOLUTION INTRAVENOUS at 05:40

## 2020-06-12 RX ADMIN — PIPERACILLIN SODIUM AND TAZOBACTAM SODIUM SCH MLS/HR: .375; 3 INJECTION, POWDER, LYOPHILIZED, FOR SOLUTION INTRAVENOUS at 18:28

## 2020-06-12 RX ADMIN — SODIUM HYPOCHLORITE SCH ML: 2.5 SOLUTION TOPICAL at 09:39

## 2020-06-12 NOTE — NUR
END OF SHIFT NOTE:  PT HAD AN EVENTFUL SHIFT.  THIS AM BEDSIDE RN AND WOUND CARE RN FLUSHED 
BILAT LEG WOUNDS WITH SALINE THEN SOAKED THEM IN DAKINS SOLUTION AND WRAPPED THEM IN CHUCKS 
TO HELP KILL MAGGOTS.  STROMECTAL GIVEN PER OG TUBE PER MD ORDERS TO HELP KILL MAGGOTS.  PT 
RECEIVED 1 UNIT PACKED RED BLOOD CELLS PER MD ORDERS.  NO BM THIS SHIFT.  PT CHECKED ON 
HOURLY AND PRN BY NURSING STAFF.

## 2020-06-12 NOTE — NUR
ICU RN NOTE



PATIENT REMAINED STABLE THROUGHOUT THE NIGHT. NO SIGNIFICANT CHANGES NOTED. ALL DUE MEDS 
GIVEN AS ORDERED AND TOLERATED WELL. REPOSITIONED Q2H. IN NO APPARENT DISTRESS NOTED AT THIS 
TIME. WILL ENDORSE TO AM SHIFT RN FOR CONTINUATION OF CARE.

## 2020-06-12 NOTE — NUR
RT NOTE



RECEIVED PT MECHANICALLY VENTILATED VIA 7.5  ETT 22 CM AT LIP. CUFF INFLATED. ETT SECURE. 
ALARMS SET PER PROTOCOL AND AUDIBLE. VENTILATOR SETTINGS AS PRESCRIBED. VENT PLUGGED IN TO 
RED OUTLET. AMBU BAG AT BED SIDE. NO DISTRESS NOTED AT MOMENT. 

-------------------------------------------------------------------------------

Addendum: 06/12/20 at 1555 by DALTON MORA RT

-------------------------------------------------------------------------------

Amended: Links added.

## 2020-06-12 NOTE — NUR
RT NOTES

PT RECEIVED ORALLY INTUBATED ON [facility] VENT ON CHARTED AC MODE SETTINGS. NO SIGNS OF RESP 
DISTRESS/SOB NOTED THROUGHOUT SHIFT. AIRWAY PATENT AND SECURED. MLT DONE. PT SUCTIONED. 
ALARMS SET AND AUDIBLE. AMBUBAG AT [facility]. VENT CONNECTED TO RED OUTLET. WILL CONT TO MONITOR.

-------------------------------------------------------------------------------

Addendum: 06/12/20 at 0531 by BONIFACIO COLES RT

-------------------------------------------------------------------------------

Amended: Links added.

## 2020-06-12 NOTE — NUR
WOUND CARE CONSULT: PT PRESENTS WITH MULTIPLE INTACT DEEP TISSUE INJURIES WITH GENERALIZED 
[place], ESPECIALLY TO RT HIP AREA, RASH TO SCROTAL AREA  AS WELL AS MAGGOT- INFESTED LOWER 
LEGS WITH OPEN SKIN AND EDEMA/REDNESS, ALL PRESENT ON ADMISSION. RECOMMEND DPM CONSULT AND 
SURGICAL CONSULT. DR SALMON AND DR PEACOCK NOTIFIED OF CONSULT REQUEST.  HALF STRENGTH 
DAKINS SOLUTION WAS USED TO REMOVE AS MANY MAGGOTS AS POSSIBLE FROM LOWER EXTREMITIES. LOWER 
LEGS WERE WRAPPED WITH DAKINS MOISTENED EXTRASORB PADS TIL SEEN BY DPM. PT TOLERATED WELL. 
RECOMMENDATIONS MADE FOR SKIN PROTECTION. DISCUSSED WITH NURSING STAFF. WILL SEE PRN. MD IN 
AGREEMENT WITH PLAN OF CARE. 

-------------------------------------------------------------------------------

Addendum: 06/12/20 at 0852 by KENNY HALL WNDNU

-------------------------------------------------------------------------------

Amended: Links added.

## 2020-06-12 NOTE — NUR
ICU RN NOTE



PATIENT TOLERATED BED BATH WELL AT THIS TIME. NO BM NOTED. NOTED LARGE AMOUNT OF MAGGOTS IN 
BILATERAL LOWER EXTREMITIES, UNABLE TO ELIMINATE ALL MAGGOTS. WILL CONTINUE TO MONITOR.

## 2020-06-12 NOTE — NUR
Warm handoff by MARCIANO Chun. Per MD notes, pt is a 67-year-old male brought in by EMS with 
altered mental status.  Patient was found down in an alley unresponsive, tachypneic, minute 
and hot to touch.  Patient making incomprehensible noises here in the emergency department 
and not following any commands. Patient tachypneic, tachycardic and altered, ABG consistent 
with hypercapnic respiratory failure patient was intubated for to protect airway in ED. Pt 
is currently intubated and unable to provide any information. Per ICU CRN, pt appears to be 
homeless and had maggots in his wounds.  to f/u when pt is able to 
participate in an assessment.

## 2020-06-12 NOTE — NUR
NO SEDATION VACATION TODAY PER ORDER FROM DR. MARIN.  BILAT LOWER EXTREMITY DOPPLERS 
DEFFERED TILL TOMORROW, PT HAS DAKINS SOLUTION POOLED ON BILAT LOWER EXTREMITIES TO HELP 
KILL MAGGOTS.

## 2020-06-12 NOTE — NUR
ICU RN NOTE



RECEIVED CRITICAL LAB VALUE:

ALBUMIN 1.4

HEMOGLOBIN 6.6

HEMATOCRIT 21



DR. HASSAN MADE AWARE OF CRITICAL LAB VALUES. RECEIVED ORDER FOR 1 UNIT PRBC. ORDER 
NOTED AND CARRIED OUT. WILL CONTINUE TO MONITOR.

## 2020-06-13 VITALS — DIASTOLIC BLOOD PRESSURE: 53 MMHG | SYSTOLIC BLOOD PRESSURE: 89 MMHG

## 2020-06-13 VITALS — DIASTOLIC BLOOD PRESSURE: 49 MMHG | SYSTOLIC BLOOD PRESSURE: 82 MMHG

## 2020-06-13 VITALS — SYSTOLIC BLOOD PRESSURE: 92 MMHG | DIASTOLIC BLOOD PRESSURE: 52 MMHG

## 2020-06-13 VITALS — DIASTOLIC BLOOD PRESSURE: 51 MMHG | SYSTOLIC BLOOD PRESSURE: 91 MMHG

## 2020-06-13 VITALS — SYSTOLIC BLOOD PRESSURE: 86 MMHG | DIASTOLIC BLOOD PRESSURE: 53 MMHG

## 2020-06-13 VITALS — SYSTOLIC BLOOD PRESSURE: 99 MMHG | DIASTOLIC BLOOD PRESSURE: 56 MMHG

## 2020-06-13 VITALS — SYSTOLIC BLOOD PRESSURE: 85 MMHG | DIASTOLIC BLOOD PRESSURE: 51 MMHG

## 2020-06-13 VITALS — DIASTOLIC BLOOD PRESSURE: 58 MMHG | SYSTOLIC BLOOD PRESSURE: 95 MMHG

## 2020-06-13 VITALS — DIASTOLIC BLOOD PRESSURE: 67 MMHG | SYSTOLIC BLOOD PRESSURE: 104 MMHG

## 2020-06-13 VITALS — SYSTOLIC BLOOD PRESSURE: 99 MMHG | DIASTOLIC BLOOD PRESSURE: 63 MMHG

## 2020-06-13 VITALS — DIASTOLIC BLOOD PRESSURE: 65 MMHG | SYSTOLIC BLOOD PRESSURE: 103 MMHG

## 2020-06-13 VITALS — SYSTOLIC BLOOD PRESSURE: 96 MMHG | DIASTOLIC BLOOD PRESSURE: 58 MMHG

## 2020-06-13 VITALS — SYSTOLIC BLOOD PRESSURE: 100 MMHG | DIASTOLIC BLOOD PRESSURE: 59 MMHG

## 2020-06-13 VITALS — DIASTOLIC BLOOD PRESSURE: 57 MMHG | SYSTOLIC BLOOD PRESSURE: 94 MMHG

## 2020-06-13 VITALS — SYSTOLIC BLOOD PRESSURE: 99 MMHG | DIASTOLIC BLOOD PRESSURE: 59 MMHG

## 2020-06-13 VITALS — DIASTOLIC BLOOD PRESSURE: 51 MMHG | SYSTOLIC BLOOD PRESSURE: 89 MMHG

## 2020-06-13 VITALS — SYSTOLIC BLOOD PRESSURE: 85 MMHG | DIASTOLIC BLOOD PRESSURE: 48 MMHG

## 2020-06-13 VITALS — SYSTOLIC BLOOD PRESSURE: 97 MMHG | DIASTOLIC BLOOD PRESSURE: 53 MMHG

## 2020-06-13 VITALS — DIASTOLIC BLOOD PRESSURE: 64 MMHG | SYSTOLIC BLOOD PRESSURE: 102 MMHG

## 2020-06-13 VITALS — DIASTOLIC BLOOD PRESSURE: 58 MMHG | SYSTOLIC BLOOD PRESSURE: 92 MMHG

## 2020-06-13 VITALS — DIASTOLIC BLOOD PRESSURE: 49 MMHG | SYSTOLIC BLOOD PRESSURE: 88 MMHG

## 2020-06-13 VITALS — DIASTOLIC BLOOD PRESSURE: 57 MMHG | SYSTOLIC BLOOD PRESSURE: 97 MMHG

## 2020-06-13 LAB
BASOPHILS # BLD AUTO: 0.1 /CMM (ref 0–0.2)
BASOPHILS NFR BLD AUTO: 1.2 % (ref 0–2)
BUN SERPL-MCNC: 21 MG/DL (ref 7–18)
CALCIUM SERPL-MCNC: 7.2 MG/DL (ref 8.5–10.1)
CHLORIDE SERPL-SCNC: 109 MMOL/L (ref 98–107)
CO2 SERPL-SCNC: 21 MMOL/L (ref 21–32)
CREAT SERPL-MCNC: 0.9 MG/DL (ref 0.6–1.3)
CRP SERPL-MCNC: 9.3 MG/DL (ref 0–0.9)
EOSINOPHIL NFR BLD AUTO: 0 % (ref 0–6)
GLUCOSE SERPL-MCNC: 151 MG/DL (ref 74–106)
HCT VFR BLD AUTO: 22 % (ref 39–51)
HGB BLD-MCNC: 7.4 G/DL (ref 13.5–17.5)
LYMPHOCYTES NFR BLD AUTO: 0.9 /CMM (ref 0.8–4.8)
LYMPHOCYTES NFR BLD AUTO: 8.2 % (ref 20–44)
MAGNESIUM SERPL-MCNC: 2.5 MG/DL (ref 1.8–2.4)
MCHC RBC AUTO-ENTMCNC: 33 G/DL (ref 31–36)
MCV RBC AUTO: 75 FL (ref 80–96)
MONOCYTES NFR BLD AUTO: 0.8 /CMM (ref 0.1–1.3)
MONOCYTES NFR BLD AUTO: 7.2 % (ref 2–12)
NEUTROPHILS # BLD AUTO: 9.2 /CMM (ref 1.8–8.9)
NEUTROPHILS NFR BLD AUTO: 83.4 % (ref 43–81)
PHOSPHATE SERPL-MCNC: 2.8 MG/DL (ref 2.5–4.9)
PLATELET # BLD AUTO: 217 /CMM (ref 150–450)
POTASSIUM SERPL-SCNC: 4.1 MMOL/L (ref 3.5–5.1)
PTH-INTACT SERPL-MCNC: 61 PG/ML (ref 15–65)
RBC # BLD AUTO: 2.99 MIL/UL (ref 4.5–6)
SODIUM SERPL-SCNC: 139 MMOL/L (ref 136–145)
WBC NRBC COR # BLD AUTO: 11 K/UL (ref 4.3–11)

## 2020-06-13 RX ADMIN — HYDROCORTISONE SODIUM SUCCINATE SCH MG: 100 INJECTION, POWDER, FOR SOLUTION INTRAMUSCULAR; INTRAVASCULAR at 09:18

## 2020-06-13 RX ADMIN — PROPOFOL PRN MLS/HR: 10 INJECTION, EMULSION INTRAVENOUS at 15:31

## 2020-06-13 RX ADMIN — HYDROCORTISONE SODIUM SUCCINATE SCH MG: 100 INJECTION, POWDER, FOR SOLUTION INTRAMUSCULAR; INTRAVASCULAR at 20:10

## 2020-06-13 RX ADMIN — PIPERACILLIN SODIUM AND TAZOBACTAM SODIUM SCH MLS/HR: .375; 3 INJECTION, POWDER, LYOPHILIZED, FOR SOLUTION INTRAVENOUS at 10:33

## 2020-06-13 RX ADMIN — PROPOFOL PRN MLS/HR: 10 INJECTION, EMULSION INTRAVENOUS at 11:45

## 2020-06-13 RX ADMIN — PROPOFOL PRN MLS/HR: 10 INJECTION, EMULSION INTRAVENOUS at 17:54

## 2020-06-13 RX ADMIN — PROPOFOL PRN MLS/HR: 10 INJECTION, EMULSION INTRAVENOUS at 23:05

## 2020-06-13 RX ADMIN — SODIUM CHLORIDE SCH MG: 9 INJECTION, SOLUTION INTRAVENOUS at 20:10

## 2020-06-13 RX ADMIN — PIPERACILLIN SODIUM AND TAZOBACTAM SODIUM SCH MLS/HR: .375; 3 INJECTION, POWDER, LYOPHILIZED, FOR SOLUTION INTRAVENOUS at 01:10

## 2020-06-13 RX ADMIN — SODIUM CHLORIDE SCH MLS/HR: 9 INJECTION, SOLUTION INTRAVENOUS at 14:16

## 2020-06-13 RX ADMIN — SODIUM CHLORIDE SCH MG: 9 INJECTION, SOLUTION INTRAVENOUS at 09:18

## 2020-06-13 RX ADMIN — PROPOFOL PRN MLS/HR: 10 INJECTION, EMULSION INTRAVENOUS at 06:56

## 2020-06-13 RX ADMIN — PIPERACILLIN SODIUM AND TAZOBACTAM SODIUM SCH MLS/HR: .375; 3 INJECTION, POWDER, LYOPHILIZED, FOR SOLUTION INTRAVENOUS at 17:54

## 2020-06-13 RX ADMIN — DEXTROSE MONOHYDRATE SCH MLS/HR: 50 INJECTION, SOLUTION INTRAVENOUS at 08:18

## 2020-06-13 RX ADMIN — PROPOFOL PRN MLS/HR: 10 INJECTION, EMULSION INTRAVENOUS at 02:42

## 2020-06-13 RX ADMIN — DEXTROSE MONOHYDRATE SCH MLS/HR: 50 INJECTION, SOLUTION INTRAVENOUS at 20:04

## 2020-06-13 RX ADMIN — SODIUM HYPOCHLORITE SCH ML: 2.5 SOLUTION TOPICAL at 08:18

## 2020-06-13 NOTE — NUR
WOUND CARE DONE PER MD ORDERS.  PRIOR TO REMOVING ABSORBANT PADS (CHUCKS) FROM AROUND PT'S 
LEGS RN PUT 4 CHUCKS PAD WITH A RED TRASH BAG IN BETWEEN THE 2ND AND 3RD LAYER.  AFTER 
OPENING THE CHUCKS PADS WRAPPED AROUND EACH LEG, LEGS WERE RINSE WITH SALINE TO REMOVE ANY 
DEAD MAGGOTS.  THE LEFT LEG WOUND HAD FAR LESS MAGGOTS THAN YESTERDAY BUT STILL SEVERAL LIVE 
MAGGOTS.  PER MD ORDERS LEGS WERE RINSE THOROUGHLY WITH BETADINE TO HELP BRING OUT ANY 
HIDDEN MAGGOTS.  SOILED CHUCKS PADS THEN REMOVED AND PUT IN RED TRASH BAG AND PUT IN RED 
TRASH BIN.  WITH LEGS ON CLEAN CHUCKS PADS LEGS WERE WERE RINSED THOROUGHLY WITH DAKINS 
SOLUTION THEN WRAPPED IN CHUCKS PADS AND TAPED CLOSED TO KEEP THE DAKINS SOLUTION ON LEG TO 
HELP KILL THE REMAINING MAGGOTS.  PT'S RIGHT LEG THERE WERE NO VISIBLE MAGGOTS NOTED.  NO 
MAGGOTS SEEN ON BEDDING OUTSIDE THE DRESSING.  CHUCKS PADS TO REMAIN CLOSED AROUND LEGS TILL 
TOMORROW MORNING WOUND CARE TIME PER MD ORDERS.



DURING WOUND CARE PATIENTS PROPOFOL NEEDED TO BE TURNED UP AS PATIENT APPEARED TO BE IN 
VISIBLE PAIN.  NO SEDATION VACATION TODAY D/T INCREASED PAIN FROM DRESSING CHANGE AND NO 
WEANING TODAY D/T CONTINUED LIVE MAGGOTS ON LEFT LEG WOUND.  MD'S NOTIFIED.  LOWER EXTREMITY 
DOPPLERS DEFERRED AGAIN TODAY D/T LIVE MAGGOTS.  RADIOLOGY NOTIFIED.

## 2020-06-13 NOTE — NUR
ICU RN OPENING NOTE



RECEIVED PATIENT IN BED RESTING WITH HOB ELEVATED. BREATHING EVEN AND NON LABORED. ON 
ETT/VENT SETTING AS ORDERED SPO2 100% NO SIGN AND SYMPTOMS OF RESPIRATORY DISTRESS. SEDATED, 
ON DIPRIVAN RUNNING AT 40 MCG/KG/MIN.VIA VIVIEN PICC LINE. TELE MONITOR READING OF SINUS RHYTHM 
60'S RESPONSIVE TO LOCALIZED PAIN.OGT CLAMPED AND ON PLACED IV SITES ON RFA GAUGE 18 AND LAC 
GAUGE 18, BOTH PATENT. ON CLEMENT CATH, URINE IS CLEAR AND YELLOW IN COLOR. PATIENT IS ON 
BILATERAL SOFT WRIST RESTRAINS. WOUND DRESSINGS ON BILATERAL LOWER EXTREMITIES CLEAN AND 
DRY. IN NO APPARENT DISTRESS NOTED AT THIS TIME. WILL CONTINUE TO MONITOR.

## 2020-06-13 NOTE — NUR
RN NOTE



NO ACUTE CHANGES OBSERVED OVERNIGHT. PT WITH ET TUBE AND ON MECHANICAL VENTILATOR TOLERATING 
CURRENT SETTINGS WELL. CALL LIGHT WITHIN REACH, SAFETY MEASURES IN PLACE, WILL ENDORSE TO 
MORNING RN FOR CONTINUATION OF CARE.

## 2020-06-13 NOTE — NUR
RT



PATIENT WAS RECEIVED ORALLY INTUBATED ON MECHANICAL VENT WITH CHARTED SETTINGS. PATIENT 
STABLE THROUGHOUT THE SHIFT , NO SOB NOTED.ALARMS ARE SET AND AUDIBLE, VENT PLUGGED INTO RED 
OUTLET.RUSS AT [facility],WILL CONTINUE TO MONITOR.


-------------------------------------------------------------------------------

Addendum: 06/13/20 at 0613 by RUY MORA RT

-------------------------------------------------------------------------------

Amended: Links added.

## 2020-06-13 NOTE — NUR
END OF SHIFT NOTE:  PT HAD A FAIRLY UNEVENTFUL SHIFT.  AS PER PREVIOUS NOTE WOUND CARE DONE, 
NO SEDATION VACATION THIS SHIFT, NO WEANING TRIAL PER DR. ADRIAN, NO BILAT LOWER EXT DOPPLERS 
UNTIL MAGGOTS ARE GONE.  NEXT DRESSING CHANGE AND ASSESSMENT OF MAGGOTS IS TOMORROW MORNING. 
 PT CHECKED ON HOURLY AND PRN BY NURSING STAFF.

## 2020-06-14 VITALS — DIASTOLIC BLOOD PRESSURE: 50 MMHG | SYSTOLIC BLOOD PRESSURE: 99 MMHG

## 2020-06-14 VITALS — DIASTOLIC BLOOD PRESSURE: 57 MMHG | SYSTOLIC BLOOD PRESSURE: 111 MMHG

## 2020-06-14 VITALS — SYSTOLIC BLOOD PRESSURE: 108 MMHG | DIASTOLIC BLOOD PRESSURE: 56 MMHG

## 2020-06-14 VITALS — DIASTOLIC BLOOD PRESSURE: 59 MMHG | SYSTOLIC BLOOD PRESSURE: 105 MMHG

## 2020-06-14 VITALS — SYSTOLIC BLOOD PRESSURE: 109 MMHG | DIASTOLIC BLOOD PRESSURE: 55 MMHG

## 2020-06-14 VITALS — DIASTOLIC BLOOD PRESSURE: 59 MMHG | SYSTOLIC BLOOD PRESSURE: 116 MMHG

## 2020-06-14 VITALS — SYSTOLIC BLOOD PRESSURE: 119 MMHG | DIASTOLIC BLOOD PRESSURE: 64 MMHG

## 2020-06-14 VITALS — SYSTOLIC BLOOD PRESSURE: 97 MMHG | DIASTOLIC BLOOD PRESSURE: 55 MMHG

## 2020-06-14 VITALS — SYSTOLIC BLOOD PRESSURE: 102 MMHG | DIASTOLIC BLOOD PRESSURE: 52 MMHG

## 2020-06-14 VITALS — DIASTOLIC BLOOD PRESSURE: 70 MMHG | SYSTOLIC BLOOD PRESSURE: 107 MMHG

## 2020-06-14 VITALS — SYSTOLIC BLOOD PRESSURE: 98 MMHG | DIASTOLIC BLOOD PRESSURE: 56 MMHG

## 2020-06-14 VITALS — DIASTOLIC BLOOD PRESSURE: 56 MMHG | SYSTOLIC BLOOD PRESSURE: 108 MMHG

## 2020-06-14 VITALS — SYSTOLIC BLOOD PRESSURE: 86 MMHG | DIASTOLIC BLOOD PRESSURE: 52 MMHG

## 2020-06-14 VITALS — SYSTOLIC BLOOD PRESSURE: 99 MMHG | DIASTOLIC BLOOD PRESSURE: 50 MMHG

## 2020-06-14 VITALS — SYSTOLIC BLOOD PRESSURE: 111 MMHG | DIASTOLIC BLOOD PRESSURE: 60 MMHG

## 2020-06-14 VITALS — DIASTOLIC BLOOD PRESSURE: 59 MMHG | SYSTOLIC BLOOD PRESSURE: 109 MMHG

## 2020-06-14 VITALS — SYSTOLIC BLOOD PRESSURE: 105 MMHG | DIASTOLIC BLOOD PRESSURE: 60 MMHG

## 2020-06-14 VITALS — SYSTOLIC BLOOD PRESSURE: 86 MMHG | DIASTOLIC BLOOD PRESSURE: 50 MMHG

## 2020-06-14 VITALS — DIASTOLIC BLOOD PRESSURE: 65 MMHG | SYSTOLIC BLOOD PRESSURE: 112 MMHG

## 2020-06-14 VITALS — DIASTOLIC BLOOD PRESSURE: 56 MMHG | SYSTOLIC BLOOD PRESSURE: 98 MMHG

## 2020-06-14 VITALS — SYSTOLIC BLOOD PRESSURE: 112 MMHG | DIASTOLIC BLOOD PRESSURE: 66 MMHG

## 2020-06-14 VITALS — SYSTOLIC BLOOD PRESSURE: 116 MMHG | DIASTOLIC BLOOD PRESSURE: 59 MMHG

## 2020-06-14 VITALS — SYSTOLIC BLOOD PRESSURE: 115 MMHG | DIASTOLIC BLOOD PRESSURE: 64 MMHG

## 2020-06-14 VITALS — DIASTOLIC BLOOD PRESSURE: 62 MMHG | SYSTOLIC BLOOD PRESSURE: 109 MMHG

## 2020-06-14 LAB
BASE EXCESS BLDA CALC-SCNC: -0.9 MMOL/L
BASE EXCESS BLDA CALC-SCNC: -1.4 MMOL/L
BASOPHILS # BLD AUTO: 0 /CMM (ref 0–0.2)
BASOPHILS NFR BLD AUTO: 0.3 % (ref 0–2)
BUN SERPL-MCNC: 17 MG/DL (ref 7–18)
CALCIUM SERPL-MCNC: 7.6 MG/DL (ref 8.5–10.1)
CHLORIDE SERPL-SCNC: 112 MMOL/L (ref 98–107)
CO2 SERPL-SCNC: 24 MMOL/L (ref 21–32)
CREAT SERPL-MCNC: 1 MG/DL (ref 0.6–1.3)
CRP SERPL-MCNC: 5.7 MG/DL (ref 0–0.9)
DO-HGB MFR BLDA: 134.5 MMHG
DO-HGB MFR BLDA: 156.1 MMHG
EOSINOPHIL NFR BLD AUTO: 0.1 % (ref 0–6)
GLUCOSE SERPL-MCNC: 114 MG/DL (ref 74–106)
HCT VFR BLD AUTO: 29 % (ref 39–51)
HGB BLD-MCNC: 8.7 G/DL (ref 13.5–17.5)
INHALED O2 CONCENTRATION: 40 %
INHALED O2 CONCENTRATION: 40 %
LYMPHOCYTES NFR BLD AUTO: 1.2 /CMM (ref 0.8–4.8)
LYMPHOCYTES NFR BLD AUTO: 6.7 % (ref 20–44)
MAGNESIUM SERPL-MCNC: 2.4 MG/DL (ref 1.8–2.4)
MCHC RBC AUTO-ENTMCNC: 30 G/DL (ref 31–36)
MCV RBC AUTO: 76 FL (ref 80–96)
MONOCYTES NFR BLD AUTO: 1 /CMM (ref 0.1–1.3)
MONOCYTES NFR BLD AUTO: 5.9 % (ref 2–12)
NEUTROPHILS # BLD AUTO: 15.2 /CMM (ref 1.8–8.9)
NEUTROPHILS NFR BLD AUTO: 87 % (ref 43–81)
PCO2 TEMP ADJ BLDA: 33.6 MMHG (ref 35–45)
PCO2 TEMP ADJ BLDA: 36.7 MMHG (ref 35–45)
PH TEMP ADJ BLDA: 7.42 [PH] (ref 7.35–7.45)
PH TEMP ADJ BLDA: 7.44 [PH] (ref 7.35–7.45)
PHOSPHATE SERPL-MCNC: 3.1 MG/DL (ref 2.5–4.9)
PLATELET # BLD AUTO: 213 /CMM (ref 150–450)
PO2 TEMP ADJ BLDA: 108.5 MMHG (ref 75–100)
PO2 TEMP ADJ BLDA: 90.5 MMHG (ref 75–100)
POTASSIUM SERPL-SCNC: 4.5 MMOL/L (ref 3.5–5.1)
RBC # BLD AUTO: 3.78 MIL/UL (ref 4.5–6)
SAO2 % BLDA: 96.8 % (ref 92–98.5)
SAO2 % BLDA: 97.9 % (ref 92–98.5)
SODIUM SERPL-SCNC: 143 MMOL/L (ref 136–145)
VENTILATION MODE VENT: (no result)
WBC NRBC COR # BLD AUTO: 17.5 K/UL (ref 4.3–11)

## 2020-06-14 RX ADMIN — DEXTROSE MONOHYDRATE SCH MLS/HR: 50 INJECTION, SOLUTION INTRAVENOUS at 08:03

## 2020-06-14 RX ADMIN — HYDROCORTISONE SODIUM SUCCINATE SCH MG: 100 INJECTION, POWDER, FOR SOLUTION INTRAMUSCULAR; INTRAVASCULAR at 20:26

## 2020-06-14 RX ADMIN — SODIUM HYPOCHLORITE SCH ML: 2.5 SOLUTION TOPICAL at 09:00

## 2020-06-14 RX ADMIN — PROPOFOL PRN MLS/HR: 10 INJECTION, EMULSION INTRAVENOUS at 08:30

## 2020-06-14 RX ADMIN — SODIUM CHLORIDE SCH MG: 9 INJECTION, SOLUTION INTRAVENOUS at 08:56

## 2020-06-14 RX ADMIN — PROPOFOL PRN MLS/HR: 10 INJECTION, EMULSION INTRAVENOUS at 04:37

## 2020-06-14 RX ADMIN — DEXTROSE MONOHYDRATE SCH MLS/HR: 50 INJECTION, SOLUTION INTRAVENOUS at 19:43

## 2020-06-14 RX ADMIN — PIPERACILLIN SODIUM AND TAZOBACTAM SODIUM SCH MLS/HR: .375; 3 INJECTION, POWDER, LYOPHILIZED, FOR SOLUTION INTRAVENOUS at 17:19

## 2020-06-14 RX ADMIN — SODIUM CHLORIDE SCH MG: 9 INJECTION, SOLUTION INTRAVENOUS at 20:26

## 2020-06-14 RX ADMIN — HYDROCORTISONE SODIUM SUCCINATE SCH MG: 100 INJECTION, POWDER, FOR SOLUTION INTRAMUSCULAR; INTRAVASCULAR at 08:56

## 2020-06-14 RX ADMIN — PIPERACILLIN SODIUM AND TAZOBACTAM SODIUM SCH MLS/HR: .375; 3 INJECTION, POWDER, LYOPHILIZED, FOR SOLUTION INTRAVENOUS at 02:00

## 2020-06-14 RX ADMIN — PIPERACILLIN SODIUM AND TAZOBACTAM SODIUM SCH MLS/HR: .375; 3 INJECTION, POWDER, LYOPHILIZED, FOR SOLUTION INTRAVENOUS at 09:37

## 2020-06-14 RX ADMIN — SODIUM CHLORIDE SCH MLS/HR: 9 INJECTION, SOLUTION INTRAVENOUS at 15:19

## 2020-06-14 NOTE — NUR
rn note:



Glucerna 1.2 @ 25mls/hr to advance to 60mls/hr x 24hrs while on Diprivan ordered for tube 
feeding. Noted and carried out.

## 2020-06-14 NOTE — NUR
RN CLOSING NOTES

PT ON BED SEDATED, ETT/VENT SETTING AS ORDERED TOLERATED WELL NO SIGN AND SYMPTOMS OF 
RESPIRATORY DISTRESS SPO2 100% TELE MONITOR READS SINUS RHYTHM 60'S NO SIGNIFICANT CHANGES 
ON CONDITION NOTED, ALL NEEDS ATTENDED SAFETY MEASURE MAINTAINED BED ON LOWEST POSITION AND 
LOCKED SIDE RAILS UPX4 SOFT BILATERAL WRIST RESTRAINTS RENEWED AND ON PLACED WILL ENDORSED 
TO AM SHIFT NURSE

## 2020-06-14 NOTE — NUR
rn opening note:



Received patient in bed and sedated. With mechanical ventilation at current settings being 
tolerated well. No SOB and not in respiratory distress. OGT present, patent and intact. Tele 
monitoring showing sinus rhythm in the 70s. Trent catheter draining greenish urine. IV site 
clean, dry, patent and intact. With Propofol infusing @ 40mcg/kg/min and CVP measured 
between 6-10 with Dr. Floyd informed. Call light in reach. Bed locked, low and at 
semi-ramires's position. Side rails up x3. Safety ensured and observed. Will continue to 
monitor.

## 2020-06-14 NOTE — NUR
PER RN PATIENT STILL HAVE LIVE MAGGOTS, DUPLEX VENOUS LOWER EXTREMITIES BILATERAL WILL 
POSTPONE TOMORROW.

## 2020-06-14 NOTE — NUR
RN NOTE



PT TOLERATING TUBE FEEDING WELL WITH MINIMAL GASTRIC RESIDUAL (<5ML). INCREASED TUBE FEEDING 
RATE TO 35ML/HOUR AS ORDERED. WILL CONTINUE TO MONITOR.  

-------------------------------------------------------------------------------

Addendum: 06/15/20 at 0634 by JARROD LOZANO RN

-------------------------------------------------------------------------------

ERROR: FEEDING RATE 25ML/HOUR.

## 2020-06-14 NOTE — NUR
ICU RN NOTES

RECEIVED PATIENT RESPONSIVE TO VERBAL STIMULI , OFF SEDATION , DROWSY , LETHARGIC , ON SIMV 
MODE SETTINGS AS ORDERED WITH NO SIGNS OF DISTRESS , TOLERATING WELL , ETT 7.5/22 IN PLACE , 
SR 75 ON BEDSIDE MONITOR , OGT CLAMPED IN PLACE , FC DRAINING VIA GRAVITY , BLE EXTREMITY 
WOUND DRESSING C/D/I OFFLOADED , VIVIEN PICC LINE WITH NS @ TKO , GT FEEDING ON HOLD AS NO 
FEEDING PUMP PER [facility] FROM [facility] , WILL CONTINUE TO MONITOR

## 2020-06-14 NOTE — NUR
rn note:



No acute changes noted on shift. Patient was seen earlier by Dr. Sanjeev jenkins, Dr. Purcell 
and Dr. Floyd. Currently on weaning trial with Diprivan off. Aware of venous Doppler study 
postponed today. Due medications given. Safety ensured and observed. Treatment given as 
ordered. Endorsed to SKIP Claros for continuity of care.

## 2020-06-14 NOTE — NUR
RN NOTE



VERIFIED OGT PLACEMENT VIA ASPIRATION AND AUSCULTATION. STARTED GLUCERNA 1.2 TF AS ORDERED. 
STARTED WITH RATE OF 20ML/HOUR. WILL MONITOR TOLERANCE.

## 2020-06-15 VITALS — SYSTOLIC BLOOD PRESSURE: 107 MMHG | DIASTOLIC BLOOD PRESSURE: 54 MMHG

## 2020-06-15 VITALS — DIASTOLIC BLOOD PRESSURE: 54 MMHG | SYSTOLIC BLOOD PRESSURE: 108 MMHG

## 2020-06-15 VITALS — DIASTOLIC BLOOD PRESSURE: 54 MMHG | SYSTOLIC BLOOD PRESSURE: 107 MMHG

## 2020-06-15 VITALS — DIASTOLIC BLOOD PRESSURE: 45 MMHG | SYSTOLIC BLOOD PRESSURE: 106 MMHG

## 2020-06-15 VITALS — SYSTOLIC BLOOD PRESSURE: 115 MMHG | DIASTOLIC BLOOD PRESSURE: 61 MMHG

## 2020-06-15 VITALS — SYSTOLIC BLOOD PRESSURE: 101 MMHG | DIASTOLIC BLOOD PRESSURE: 49 MMHG

## 2020-06-15 VITALS — SYSTOLIC BLOOD PRESSURE: 101 MMHG | DIASTOLIC BLOOD PRESSURE: 54 MMHG

## 2020-06-15 VITALS — SYSTOLIC BLOOD PRESSURE: 93 MMHG | DIASTOLIC BLOOD PRESSURE: 52 MMHG

## 2020-06-15 VITALS — DIASTOLIC BLOOD PRESSURE: 53 MMHG | SYSTOLIC BLOOD PRESSURE: 104 MMHG

## 2020-06-15 VITALS — DIASTOLIC BLOOD PRESSURE: 52 MMHG | SYSTOLIC BLOOD PRESSURE: 104 MMHG

## 2020-06-15 VITALS — DIASTOLIC BLOOD PRESSURE: 46 MMHG | SYSTOLIC BLOOD PRESSURE: 97 MMHG

## 2020-06-15 VITALS — SYSTOLIC BLOOD PRESSURE: 96 MMHG | DIASTOLIC BLOOD PRESSURE: 47 MMHG

## 2020-06-15 VITALS — DIASTOLIC BLOOD PRESSURE: 55 MMHG | SYSTOLIC BLOOD PRESSURE: 111 MMHG

## 2020-06-15 VITALS — DIASTOLIC BLOOD PRESSURE: 56 MMHG | SYSTOLIC BLOOD PRESSURE: 110 MMHG

## 2020-06-15 VITALS — SYSTOLIC BLOOD PRESSURE: 136 MMHG | DIASTOLIC BLOOD PRESSURE: 66 MMHG

## 2020-06-15 VITALS — DIASTOLIC BLOOD PRESSURE: 59 MMHG | SYSTOLIC BLOOD PRESSURE: 117 MMHG

## 2020-06-15 VITALS — DIASTOLIC BLOOD PRESSURE: 70 MMHG | SYSTOLIC BLOOD PRESSURE: 138 MMHG

## 2020-06-15 VITALS — SYSTOLIC BLOOD PRESSURE: 111 MMHG | DIASTOLIC BLOOD PRESSURE: 57 MMHG

## 2020-06-15 VITALS — DIASTOLIC BLOOD PRESSURE: 58 MMHG | SYSTOLIC BLOOD PRESSURE: 121 MMHG

## 2020-06-15 VITALS — SYSTOLIC BLOOD PRESSURE: 109 MMHG | DIASTOLIC BLOOD PRESSURE: 52 MMHG

## 2020-06-15 VITALS — DIASTOLIC BLOOD PRESSURE: 62 MMHG | SYSTOLIC BLOOD PRESSURE: 114 MMHG

## 2020-06-15 VITALS — DIASTOLIC BLOOD PRESSURE: 70 MMHG | SYSTOLIC BLOOD PRESSURE: 135 MMHG

## 2020-06-15 VITALS — DIASTOLIC BLOOD PRESSURE: 62 MMHG | SYSTOLIC BLOOD PRESSURE: 121 MMHG

## 2020-06-15 VITALS — DIASTOLIC BLOOD PRESSURE: 49 MMHG | SYSTOLIC BLOOD PRESSURE: 95 MMHG

## 2020-06-15 VITALS — SYSTOLIC BLOOD PRESSURE: 98 MMHG | DIASTOLIC BLOOD PRESSURE: 46 MMHG

## 2020-06-15 VITALS — DIASTOLIC BLOOD PRESSURE: 63 MMHG | SYSTOLIC BLOOD PRESSURE: 119 MMHG

## 2020-06-15 VITALS — DIASTOLIC BLOOD PRESSURE: 65 MMHG | SYSTOLIC BLOOD PRESSURE: 124 MMHG

## 2020-06-15 VITALS — SYSTOLIC BLOOD PRESSURE: 111 MMHG | DIASTOLIC BLOOD PRESSURE: 60 MMHG

## 2020-06-15 VITALS — SYSTOLIC BLOOD PRESSURE: 106 MMHG | DIASTOLIC BLOOD PRESSURE: 47 MMHG

## 2020-06-15 VITALS — SYSTOLIC BLOOD PRESSURE: 114 MMHG | DIASTOLIC BLOOD PRESSURE: 60 MMHG

## 2020-06-15 VITALS — SYSTOLIC BLOOD PRESSURE: 124 MMHG | DIASTOLIC BLOOD PRESSURE: 65 MMHG

## 2020-06-15 VITALS — DIASTOLIC BLOOD PRESSURE: 58 MMHG | SYSTOLIC BLOOD PRESSURE: 100 MMHG

## 2020-06-15 VITALS — DIASTOLIC BLOOD PRESSURE: 54 MMHG | SYSTOLIC BLOOD PRESSURE: 102 MMHG

## 2020-06-15 VITALS — DIASTOLIC BLOOD PRESSURE: 59 MMHG | SYSTOLIC BLOOD PRESSURE: 111 MMHG

## 2020-06-15 VITALS — SYSTOLIC BLOOD PRESSURE: 115 MMHG | DIASTOLIC BLOOD PRESSURE: 65 MMHG

## 2020-06-15 VITALS — SYSTOLIC BLOOD PRESSURE: 97 MMHG | DIASTOLIC BLOOD PRESSURE: 43 MMHG

## 2020-06-15 VITALS — DIASTOLIC BLOOD PRESSURE: 76 MMHG | SYSTOLIC BLOOD PRESSURE: 128 MMHG

## 2020-06-15 VITALS — DIASTOLIC BLOOD PRESSURE: 65 MMHG | SYSTOLIC BLOOD PRESSURE: 112 MMHG

## 2020-06-15 VITALS — DIASTOLIC BLOOD PRESSURE: 55 MMHG | SYSTOLIC BLOOD PRESSURE: 106 MMHG

## 2020-06-15 VITALS — SYSTOLIC BLOOD PRESSURE: 105 MMHG | DIASTOLIC BLOOD PRESSURE: 54 MMHG

## 2020-06-15 LAB
ALBUMIN SERPL BCP-MCNC: 1.5 G/DL (ref 3.4–5)
ALBUMIN SERPL ELPH-MCNC: 1.6 G/DL (ref 2.9–4.4)
ALBUMIN/GLOB SERPL: 0.4 {RATIO} (ref 0.7–1.7)
ALP SERPL-CCNC: 86 U/L (ref 46–116)
ALPHA1 GLOB SERPL ELPH-MCNC: 0.4 G/DL (ref 0–0.4)
ALPHA2 GLOB SERPL ELPH-MCNC: 1 G/DL (ref 0.4–1)
ALT SERPL W P-5'-P-CCNC: 46 U/L (ref 12–78)
AST SERPL W P-5'-P-CCNC: 68 U/L (ref 15–37)
B-GLOBULIN SERPL ELPH-MCNC: 0.7 G/DL (ref 0.7–1.3)
BASE EXCESS BLDA CALC-SCNC: -1.1 MMOL/L
BASOPHILS # BLD AUTO: 0 /CMM (ref 0–0.2)
BASOPHILS NFR BLD AUTO: 0.1 % (ref 0–2)
BILIRUB SERPL-MCNC: 0.5 MG/DL (ref 0.2–1)
BUN SERPL-MCNC: 16 MG/DL (ref 7–18)
CALCIUM SERPL-MCNC: 7.5 MG/DL (ref 8.5–10.1)
CHLORIDE SERPL-SCNC: 114 MMOL/L (ref 98–107)
CO2 SERPL-SCNC: 26 MMOL/L (ref 21–32)
CREAT SERPL-MCNC: 0.9 MG/DL (ref 0.6–1.3)
CRP SERPL-MCNC: 7.6 MG/DL (ref 0–0.9)
DO-HGB MFR BLDA: 149.7 MMHG
EOSINOPHIL NFR BLD AUTO: 0.1 % (ref 0–6)
GAMMA GLOB SERPL ELPH-MCNC: 1.6 G/DL (ref 0.4–1.8)
GLOBULIN SER CALC-MCNC: 3.6 G/DL (ref 2.2–3.9)
GLUCOSE SERPL-MCNC: 121 MG/DL (ref 74–106)
HCT VFR BLD AUTO: 27 % (ref 39–51)
HGB BLD-MCNC: 8.4 G/DL (ref 13.5–17.5)
INHALED O2 CONCENTRATION: 40 %
LYMPHOCYTES NFR BLD AUTO: 0.9 /CMM (ref 0.8–4.8)
LYMPHOCYTES NFR BLD AUTO: 4.5 % (ref 20–44)
MAGNESIUM SERPL-MCNC: 2.1 MG/DL (ref 1.8–2.4)
MCHC RBC AUTO-ENTMCNC: 31 G/DL (ref 31–36)
MCV RBC AUTO: 76 FL (ref 80–96)
MONOCYTES NFR BLD AUTO: 1.2 /CMM (ref 0.1–1.3)
MONOCYTES NFR BLD AUTO: 5.7 % (ref 2–12)
NEUTROPHILS # BLD AUTO: 18 /CMM (ref 1.8–8.9)
NEUTROPHILS NFR BLD AUTO: 89.6 % (ref 43–81)
PCO2 TEMP ADJ BLDA: 34.1 MMHG (ref 35–45)
PH TEMP ADJ BLDA: 7.44 [PH] (ref 7.35–7.45)
PHOSPHATE SERPL-MCNC: 2.7 MG/DL (ref 2.5–4.9)
PLATELET # BLD AUTO: 269 /CMM (ref 150–450)
PO2 TEMP ADJ BLDA: 96.3 MMHG (ref 75–100)
POTASSIUM SERPL-SCNC: 3.4 MMOL/L (ref 3.5–5.1)
PROT SERPL-MCNC: 6 G/DL (ref 6.4–8.2)
RBC # BLD AUTO: 3.61 MIL/UL (ref 4.5–6)
SAO2 % BLDA: 97.2 % (ref 92–98.5)
SODIUM SERPL-SCNC: 146 MMOL/L (ref 136–145)
VENTILATION MODE VENT: (no result)
WBC NRBC COR # BLD AUTO: 20.1 K/UL (ref 4.3–11)

## 2020-06-15 RX ADMIN — HYDROCORTISONE SODIUM SUCCINATE SCH MG: 100 INJECTION, POWDER, FOR SOLUTION INTRAMUSCULAR; INTRAVASCULAR at 20:27

## 2020-06-15 RX ADMIN — SODIUM CHLORIDE SCH MG: 9 INJECTION, SOLUTION INTRAVENOUS at 20:27

## 2020-06-15 RX ADMIN — DEXTROSE MONOHYDRATE SCH MLS/HR: 50 INJECTION, SOLUTION INTRAVENOUS at 08:56

## 2020-06-15 RX ADMIN — PROPOFOL PRN MLS/HR: 10 INJECTION, EMULSION INTRAVENOUS at 18:00

## 2020-06-15 RX ADMIN — SODIUM CHLORIDE SCH MG: 9 INJECTION, SOLUTION INTRAVENOUS at 08:56

## 2020-06-15 RX ADMIN — HYDROCORTISONE SODIUM SUCCINATE SCH MG: 100 INJECTION, POWDER, FOR SOLUTION INTRAMUSCULAR; INTRAVASCULAR at 08:56

## 2020-06-15 RX ADMIN — SODIUM CHLORIDE SCH MLS/HR: 9 INJECTION, SOLUTION INTRAVENOUS at 14:11

## 2020-06-15 RX ADMIN — PIPERACILLIN SODIUM AND TAZOBACTAM SODIUM SCH MLS/HR: .375; 3 INJECTION, POWDER, LYOPHILIZED, FOR SOLUTION INTRAVENOUS at 10:07

## 2020-06-15 RX ADMIN — SODIUM HYPOCHLORITE SCH ML: 2.5 SOLUTION TOPICAL at 08:57

## 2020-06-15 RX ADMIN — PIPERACILLIN SODIUM AND TAZOBACTAM SODIUM SCH MLS/HR: .375; 3 INJECTION, POWDER, LYOPHILIZED, FOR SOLUTION INTRAVENOUS at 01:37

## 2020-06-15 RX ADMIN — DEXTROSE MONOHYDRATE SCH MLS/HR: 50 INJECTION, SOLUTION INTRAVENOUS at 20:26

## 2020-06-15 RX ADMIN — HYDROGEN PEROXIDE SCH ML: 2.65 LIQUID TOPICAL at 20:27

## 2020-06-15 RX ADMIN — HYDROGEN PEROXIDE SCH ML: 2.65 LIQUID TOPICAL at 16:42

## 2020-06-15 RX ADMIN — PIPERACILLIN SODIUM AND TAZOBACTAM SODIUM SCH MLS/HR: .375; 3 INJECTION, POWDER, LYOPHILIZED, FOR SOLUTION INTRAVENOUS at 17:57

## 2020-06-15 RX ADMIN — PROPOFOL PRN MLS/HR: 10 INJECTION, EMULSION INTRAVENOUS at 06:36

## 2020-06-15 NOTE — NUR
LCSW consulted with ICU  SYDNEY Schulz. Per SYDNEY Schulz, pt had no belongings/ identification with him 
upon arrival to [facility]. Pt is currently intubated and unable to provide any information at this 
time. [phone] for [facility] but 
was unable to speak to anyone or leave a message. There was a continuous recording. Warm 
handoff provided for tomorrow's SW to f/u.

## 2020-06-15 NOTE — NUR
RN NOTE



PT SEDATED IN BED IN SEMI BEYER'S POSITION. ET TUBE IN PLACE. PT ON AC MODE AND CURRENTLY 
TOLERATING SETTINGS WELL. TUBE FEEDING RUNNING AS ORDERED AND TOLERATING WELL. CVP 
MONITORING READS 7-10. CLEMENT CATHETER IN PLACE DRAINING TEA COLORED URINE. CALL LIGHT WITHIN 
REACH, SAFETY MEASURES IN PLACE, WILL ENDORSE TO MORNING RN FOR CONTINUATION OF CARE.

## 2020-06-15 NOTE — NUR
RN NOTES



RECEIVED PATIENT  SEDATED WITH DIPRIVAN AT 10MCG/KG/MIN. ORALLY INTUBATED WITH ETT SIZE 7.5 
22CM ON THE LIP. ON OXYGEN SUPPORT VIA MECH VENT WITH THE FOLLOWING SETTINGS AC14, , 
FIO2 40% AND PEEP 5. PATIENT TOLERATING CURRENT VENT SETTINGS WITH BREATHING UNLABORED. 
SATING 100% AT THIS TIME. WITH ONGOING GT FEEDING OF GLUCERNA AT 25CC/HR, PATIENT ABLE TO 
TOLERATE WITHOUT GASTRIC RESIDUAL TAKEN UPON CHECKING.  BILATERAL SOFT RESTRAINTS IN PLACE, 
REMOVE AND REPLACED. NO SKIN BREAKDOOWN NOTED ON THE SITE. CLEMENT CATHETER IN PLAICE- 
DRAINING VIA GRAVITY  TO TEA COLOR URINE. SAFETY MEASURES IN PLACE. HOB ELEVATED. SRX2. BED 
IN LOW AND LOCKED POSITIONED. CALL LIGHT WITHIN REACH. LWILL CONTINUE TO MONITOR PATIENT 
ACCORDINGLY

## 2020-06-15 NOTE — NUR
N NOTES



ENDORSED FOR CONTINUITY OF CARE. NOT ON ANY FORM OF DISTRESS. NO INDICATION OF BLEEDING 
NOTED. ALL NURSING NEEDS ATTENDED AND MET. ALL SAFETY MEASURES IN PLACE. CALL LIGHT WITHIN 
REACH. ISOLATION PRECAUTION INITIATE AT ALL TIMES

## 2020-06-15 NOTE — NUR
RN NOTE



PT NOTED TO HAVE BECOME TACHYPNEIC. RR 30-45. RT RETURNED PT TO AC MODE. AC 14, TIDAL VOLUME 
500. FIO2 40% AND PEEP 5. DIPROVAN STARTED AT 5MCG. WILL MONITOR PT.

## 2020-06-15 NOTE — NUR
pt returned to ac mode on vent due to increased rr to 38 per dr culp verbal order "return 
to ac if pt is not tolerating"

-------------------------------------------------------------------------------

Addendum: 06/15/20 at 0637 by SERA DENT RT

-------------------------------------------------------------------------------

Amended: Links added.

## 2020-06-16 VITALS — SYSTOLIC BLOOD PRESSURE: 121 MMHG | DIASTOLIC BLOOD PRESSURE: 61 MMHG

## 2020-06-16 VITALS — DIASTOLIC BLOOD PRESSURE: 73 MMHG | SYSTOLIC BLOOD PRESSURE: 131 MMHG

## 2020-06-16 VITALS — SYSTOLIC BLOOD PRESSURE: 128 MMHG | DIASTOLIC BLOOD PRESSURE: 61 MMHG

## 2020-06-16 VITALS — SYSTOLIC BLOOD PRESSURE: 143 MMHG | DIASTOLIC BLOOD PRESSURE: 92 MMHG

## 2020-06-16 VITALS — DIASTOLIC BLOOD PRESSURE: 41 MMHG | SYSTOLIC BLOOD PRESSURE: 134 MMHG

## 2020-06-16 VITALS — SYSTOLIC BLOOD PRESSURE: 107 MMHG | DIASTOLIC BLOOD PRESSURE: 73 MMHG

## 2020-06-16 VITALS — SYSTOLIC BLOOD PRESSURE: 92 MMHG | DIASTOLIC BLOOD PRESSURE: 69 MMHG

## 2020-06-16 VITALS — SYSTOLIC BLOOD PRESSURE: 128 MMHG | DIASTOLIC BLOOD PRESSURE: 76 MMHG

## 2020-06-16 VITALS — SYSTOLIC BLOOD PRESSURE: 91 MMHG | DIASTOLIC BLOOD PRESSURE: 49 MMHG

## 2020-06-16 VITALS — SYSTOLIC BLOOD PRESSURE: 101 MMHG | DIASTOLIC BLOOD PRESSURE: 44 MMHG

## 2020-06-16 VITALS — SYSTOLIC BLOOD PRESSURE: 129 MMHG | DIASTOLIC BLOOD PRESSURE: 72 MMHG

## 2020-06-16 VITALS — SYSTOLIC BLOOD PRESSURE: 96 MMHG | DIASTOLIC BLOOD PRESSURE: 47 MMHG

## 2020-06-16 VITALS — DIASTOLIC BLOOD PRESSURE: 62 MMHG | SYSTOLIC BLOOD PRESSURE: 121 MMHG

## 2020-06-16 VITALS — SYSTOLIC BLOOD PRESSURE: 122 MMHG | DIASTOLIC BLOOD PRESSURE: 69 MMHG

## 2020-06-16 VITALS — DIASTOLIC BLOOD PRESSURE: 77 MMHG | SYSTOLIC BLOOD PRESSURE: 104 MMHG

## 2020-06-16 VITALS — SYSTOLIC BLOOD PRESSURE: 128 MMHG | DIASTOLIC BLOOD PRESSURE: 63 MMHG

## 2020-06-16 VITALS — DIASTOLIC BLOOD PRESSURE: 66 MMHG | SYSTOLIC BLOOD PRESSURE: 129 MMHG

## 2020-06-16 VITALS — SYSTOLIC BLOOD PRESSURE: 138 MMHG | DIASTOLIC BLOOD PRESSURE: 69 MMHG

## 2020-06-16 VITALS — SYSTOLIC BLOOD PRESSURE: 121 MMHG | DIASTOLIC BLOOD PRESSURE: 60 MMHG

## 2020-06-16 VITALS — SYSTOLIC BLOOD PRESSURE: 123 MMHG | DIASTOLIC BLOOD PRESSURE: 79 MMHG

## 2020-06-16 VITALS — SYSTOLIC BLOOD PRESSURE: 135 MMHG | DIASTOLIC BLOOD PRESSURE: 75 MMHG

## 2020-06-16 VITALS — DIASTOLIC BLOOD PRESSURE: 43 MMHG | SYSTOLIC BLOOD PRESSURE: 106 MMHG

## 2020-06-16 VITALS — SYSTOLIC BLOOD PRESSURE: 105 MMHG | DIASTOLIC BLOOD PRESSURE: 55 MMHG

## 2020-06-16 VITALS — SYSTOLIC BLOOD PRESSURE: 93 MMHG | DIASTOLIC BLOOD PRESSURE: 50 MMHG

## 2020-06-16 VITALS — DIASTOLIC BLOOD PRESSURE: 56 MMHG | SYSTOLIC BLOOD PRESSURE: 121 MMHG

## 2020-06-16 VITALS — SYSTOLIC BLOOD PRESSURE: 104 MMHG | DIASTOLIC BLOOD PRESSURE: 54 MMHG

## 2020-06-16 VITALS — DIASTOLIC BLOOD PRESSURE: 54 MMHG | SYSTOLIC BLOOD PRESSURE: 106 MMHG

## 2020-06-16 VITALS — DIASTOLIC BLOOD PRESSURE: 73 MMHG | SYSTOLIC BLOOD PRESSURE: 135 MMHG

## 2020-06-16 VITALS — SYSTOLIC BLOOD PRESSURE: 143 MMHG | DIASTOLIC BLOOD PRESSURE: 70 MMHG

## 2020-06-16 VITALS — DIASTOLIC BLOOD PRESSURE: 61 MMHG | SYSTOLIC BLOOD PRESSURE: 124 MMHG

## 2020-06-16 VITALS — DIASTOLIC BLOOD PRESSURE: 59 MMHG | SYSTOLIC BLOOD PRESSURE: 117 MMHG

## 2020-06-16 VITALS — SYSTOLIC BLOOD PRESSURE: 120 MMHG | DIASTOLIC BLOOD PRESSURE: 62 MMHG

## 2020-06-16 VITALS — DIASTOLIC BLOOD PRESSURE: 55 MMHG | SYSTOLIC BLOOD PRESSURE: 115 MMHG

## 2020-06-16 VITALS — SYSTOLIC BLOOD PRESSURE: 154 MMHG | DIASTOLIC BLOOD PRESSURE: 128 MMHG

## 2020-06-16 VITALS — SYSTOLIC BLOOD PRESSURE: 122 MMHG | DIASTOLIC BLOOD PRESSURE: 62 MMHG

## 2020-06-16 VITALS — SYSTOLIC BLOOD PRESSURE: 107 MMHG | DIASTOLIC BLOOD PRESSURE: 50 MMHG

## 2020-06-16 VITALS — SYSTOLIC BLOOD PRESSURE: 91 MMHG | DIASTOLIC BLOOD PRESSURE: 48 MMHG

## 2020-06-16 VITALS — SYSTOLIC BLOOD PRESSURE: 136 MMHG | DIASTOLIC BLOOD PRESSURE: 84 MMHG

## 2020-06-16 VITALS — SYSTOLIC BLOOD PRESSURE: 114 MMHG | DIASTOLIC BLOOD PRESSURE: 83 MMHG

## 2020-06-16 VITALS — SYSTOLIC BLOOD PRESSURE: 102 MMHG | DIASTOLIC BLOOD PRESSURE: 46 MMHG

## 2020-06-16 VITALS — DIASTOLIC BLOOD PRESSURE: 50 MMHG | SYSTOLIC BLOOD PRESSURE: 101 MMHG

## 2020-06-16 VITALS — SYSTOLIC BLOOD PRESSURE: 123 MMHG | DIASTOLIC BLOOD PRESSURE: 65 MMHG

## 2020-06-16 VITALS — DIASTOLIC BLOOD PRESSURE: 68 MMHG | SYSTOLIC BLOOD PRESSURE: 134 MMHG

## 2020-06-16 VITALS — SYSTOLIC BLOOD PRESSURE: 129 MMHG | DIASTOLIC BLOOD PRESSURE: 64 MMHG

## 2020-06-16 VITALS — SYSTOLIC BLOOD PRESSURE: 108 MMHG | DIASTOLIC BLOOD PRESSURE: 56 MMHG

## 2020-06-16 VITALS — DIASTOLIC BLOOD PRESSURE: 65 MMHG | SYSTOLIC BLOOD PRESSURE: 127 MMHG

## 2020-06-16 VITALS — DIASTOLIC BLOOD PRESSURE: 64 MMHG | SYSTOLIC BLOOD PRESSURE: 126 MMHG

## 2020-06-16 VITALS — SYSTOLIC BLOOD PRESSURE: 138 MMHG | DIASTOLIC BLOOD PRESSURE: 79 MMHG

## 2020-06-16 LAB
BASE EXCESS BLDA CALC-SCNC: 1.7 MMOL/L
BASOPHILS # BLD AUTO: 0.2 /CMM (ref 0–0.2)
BASOPHILS NFR BLD AUTO: 1.1 % (ref 0–2)
BUN SERPL-MCNC: 16 MG/DL (ref 7–18)
CALCIUM SERPL-MCNC: 7.5 MG/DL (ref 8.5–10.1)
CHLORIDE SERPL-SCNC: 114 MMOL/L (ref 98–107)
CO2 SERPL-SCNC: 28 MMOL/L (ref 21–32)
CREAT SERPL-MCNC: 0.7 MG/DL (ref 0.6–1.3)
DO-HGB MFR BLDA: 106.5 MMHG
EOSINOPHIL NFR BLD AUTO: 0.1 % (ref 0–6)
GLUCOSE SERPL-MCNC: 140 MG/DL (ref 74–106)
HCT VFR BLD AUTO: 27 % (ref 39–51)
HGB BLD-MCNC: 8.3 G/DL (ref 13.5–17.5)
INHALED O2 CONCENTRATION: 40 %
LYMPHOCYTES NFR BLD AUTO: 0.7 /CMM (ref 0.8–4.8)
LYMPHOCYTES NFR BLD AUTO: 2.8 % (ref 20–44)
MAGNESIUM SERPL-MCNC: 2.1 MG/DL (ref 1.8–2.4)
MCHC RBC AUTO-ENTMCNC: 31 G/DL (ref 31–36)
MCV RBC AUTO: 76 FL (ref 80–96)
MONOCYTES NFR BLD AUTO: 0.8 /CMM (ref 0.1–1.3)
MONOCYTES NFR BLD AUTO: 3.3 % (ref 2–12)
NEUTROPHILS # BLD AUTO: 21.5 /CMM (ref 1.8–8.9)
NEUTROPHILS NFR BLD AUTO: 92.7 % (ref 43–81)
PCO2 TEMP ADJ BLDA: 39.3 MMHG (ref 35–45)
PH TEMP ADJ BLDA: 7.44 [PH] (ref 7.35–7.45)
PHOSPHATE SERPL-MCNC: 2.9 MG/DL (ref 2.5–4.9)
PLATELET # BLD AUTO: 117 /CMM (ref 150–450)
PO2 TEMP ADJ BLDA: 133.5 MMHG (ref 75–100)
POTASSIUM SERPL-SCNC: 3.7 MMOL/L (ref 3.5–5.1)
RBC # BLD AUTO: 3.52 MIL/UL (ref 4.5–6)
SAO2 % BLDA: 98.6 % (ref 92–98.5)
SODIUM SERPL-SCNC: 146 MMOL/L (ref 136–145)
VENTILATION MODE VENT: (no result)
WBC NRBC COR # BLD AUTO: 23.2 K/UL (ref 4.3–11)

## 2020-06-16 RX ADMIN — DEXTROSE MONOHYDRATE SCH MLS/HR: 50 INJECTION, SOLUTION INTRAVENOUS at 20:08

## 2020-06-16 RX ADMIN — DEXTROSE MONOHYDRATE SCH MLS/HR: 50 INJECTION, SOLUTION INTRAVENOUS at 08:12

## 2020-06-16 RX ADMIN — PIPERACILLIN SODIUM AND TAZOBACTAM SODIUM SCH MLS/HR: .375; 3 INJECTION, POWDER, LYOPHILIZED, FOR SOLUTION INTRAVENOUS at 17:33

## 2020-06-16 RX ADMIN — HYDROGEN PEROXIDE SCH ML: 2.65 LIQUID TOPICAL at 20:08

## 2020-06-16 RX ADMIN — PROPOFOL PRN MLS/HR: 10 INJECTION, EMULSION INTRAVENOUS at 04:11

## 2020-06-16 RX ADMIN — PIPERACILLIN SODIUM AND TAZOBACTAM SODIUM SCH MLS/HR: .375; 3 INJECTION, POWDER, LYOPHILIZED, FOR SOLUTION INTRAVENOUS at 10:21

## 2020-06-16 RX ADMIN — SODIUM CHLORIDE SCH MG: 9 INJECTION, SOLUTION INTRAVENOUS at 09:16

## 2020-06-16 RX ADMIN — HYDROGEN PEROXIDE SCH ML: 2.65 LIQUID TOPICAL at 09:17

## 2020-06-16 RX ADMIN — PIPERACILLIN SODIUM AND TAZOBACTAM SODIUM SCH MLS/HR: .375; 3 INJECTION, POWDER, LYOPHILIZED, FOR SOLUTION INTRAVENOUS at 02:07

## 2020-06-16 RX ADMIN — HYDROCORTISONE SODIUM SUCCINATE SCH MG: 100 INJECTION, POWDER, FOR SOLUTION INTRAMUSCULAR; INTRAVASCULAR at 09:16

## 2020-06-16 RX ADMIN — SODIUM HYPOCHLORITE SCH ML: 2.5 SOLUTION TOPICAL at 09:17

## 2020-06-16 RX ADMIN — HYDROCORTISONE SODIUM SUCCINATE SCH MG: 100 INJECTION, POWDER, FOR SOLUTION INTRAMUSCULAR; INTRAVASCULAR at 20:08

## 2020-06-16 RX ADMIN — SODIUM CHLORIDE SCH MG: 9 INJECTION, SOLUTION INTRAVENOUS at 20:08

## 2020-06-16 NOTE — NUR
SW NOTE: SW received handoff from [facility]. Per case management staff pt has been identified as 
Cole Montanez. Per RN, pt was extubated on this present day at 10:00am. RN states that pt is 
responsive however, states that pt is resting and recommends SW assess pt tomorrow Wednesday 6/17/20 when he is more responsive.

## 2020-06-16 NOTE — NUR
RN NOTES



NO AUTE CHANGES WITHIN THE SHIFT. ALL NURSING NEEDS ATTENDED AND MET. NOT ON ANY FORM OF 
DISTRESS. BREATHING UNLABORED. SAFETY MEASURES IN PLACE AT ALL TIME. SRX2 UP. BED IN LOW AND 
LOCKED POSITIONED. BED ALARM ON.  CALL LIGHT WITHIN REACH

## 2020-06-16 NOTE — NUR
RN NOTES



PATIENT EXTUBATED BY JUAN MIGUEL,RRT PATIENT ABLE TO TOLERATE THE PROCEDURE WELL. WAS PLACED  ON 
SUPPLEMENTAL OXYGEN VIA NASAL CANNULA AT 3LPM AND IS NOTED SATING 92 AT THIS TIME. WILL 
CONTINUE TO MONITOR PATIENT ACCORDINGLY

## 2020-06-16 NOTE — NUR
rn notes



received patient back from previous shift for continuity of care. sedated with dipriva at 
10mcg/kg/min. patient not on any form of distress. breathing unlabored. sating fine at 100%. 
 no indication of pain noted. bilateral soft restraints in place. remove and replaced, no 
skin breakdown noted on the site.  gtf in place, feeding running at desired rate. hilliard 
catheter in place and draining well via gravity to tea color urinne. hob elevated. safety 
measures measures in place. call light within reach. will continue to monitor patient 
accordingly

## 2020-06-16 NOTE — NUR
ICU/OPENING



RECEIVED PATIENT FROM MORNING SHIFT NURSE, PATIENT IS ALERT AND ABLE TO STATE FULL NAME. 
PATIENT CURRENTLY IS IN NO SIGN OF ANY DISTRESS. PATIENT IS ON 4L OF O2 ON NC SATURATING AT 
99% WITH NO SIGN OF ANY SOB. PATIENT ON THE MONITOR IS NSR HR AT 74. FC DRAINING VIA GRAVITY 
WITH MEDHAT OUTPUT. VIVIEN PICC LINE INTACT AND PATENT. ALL SAFETY PRECAUTIONS APPLIED. PATIENT 
DOES HAS LOWER EXTREMITY STRENGTH SO BED ALARM IS ON FOR PRECAUTIONS. WILL CONTINUE TO 
MONITOR PATIENT THROUGHOUT SHIFT.

## 2020-06-17 VITALS — SYSTOLIC BLOOD PRESSURE: 138 MMHG | DIASTOLIC BLOOD PRESSURE: 85 MMHG

## 2020-06-17 VITALS — SYSTOLIC BLOOD PRESSURE: 139 MMHG | DIASTOLIC BLOOD PRESSURE: 72 MMHG

## 2020-06-17 VITALS — DIASTOLIC BLOOD PRESSURE: 90 MMHG | SYSTOLIC BLOOD PRESSURE: 150 MMHG

## 2020-06-17 VITALS — DIASTOLIC BLOOD PRESSURE: 95 MMHG | SYSTOLIC BLOOD PRESSURE: 138 MMHG

## 2020-06-17 VITALS — SYSTOLIC BLOOD PRESSURE: 114 MMHG | DIASTOLIC BLOOD PRESSURE: 67 MMHG

## 2020-06-17 VITALS — SYSTOLIC BLOOD PRESSURE: 138 MMHG | DIASTOLIC BLOOD PRESSURE: 72 MMHG

## 2020-06-17 VITALS — SYSTOLIC BLOOD PRESSURE: 127 MMHG | DIASTOLIC BLOOD PRESSURE: 69 MMHG

## 2020-06-17 VITALS — DIASTOLIC BLOOD PRESSURE: 74 MMHG | SYSTOLIC BLOOD PRESSURE: 146 MMHG

## 2020-06-17 VITALS — SYSTOLIC BLOOD PRESSURE: 125 MMHG | DIASTOLIC BLOOD PRESSURE: 66 MMHG

## 2020-06-17 VITALS — SYSTOLIC BLOOD PRESSURE: 134 MMHG | DIASTOLIC BLOOD PRESSURE: 70 MMHG

## 2020-06-17 VITALS — SYSTOLIC BLOOD PRESSURE: 142 MMHG | DIASTOLIC BLOOD PRESSURE: 64 MMHG

## 2020-06-17 VITALS — DIASTOLIC BLOOD PRESSURE: 76 MMHG | SYSTOLIC BLOOD PRESSURE: 150 MMHG

## 2020-06-17 VITALS — DIASTOLIC BLOOD PRESSURE: 87 MMHG | SYSTOLIC BLOOD PRESSURE: 139 MMHG

## 2020-06-17 VITALS — DIASTOLIC BLOOD PRESSURE: 66 MMHG | SYSTOLIC BLOOD PRESSURE: 142 MMHG

## 2020-06-17 VITALS — DIASTOLIC BLOOD PRESSURE: 72 MMHG | SYSTOLIC BLOOD PRESSURE: 141 MMHG

## 2020-06-17 VITALS — SYSTOLIC BLOOD PRESSURE: 141 MMHG | DIASTOLIC BLOOD PRESSURE: 74 MMHG

## 2020-06-17 VITALS — SYSTOLIC BLOOD PRESSURE: 141 MMHG | DIASTOLIC BLOOD PRESSURE: 67 MMHG

## 2020-06-17 VITALS — DIASTOLIC BLOOD PRESSURE: 68 MMHG | SYSTOLIC BLOOD PRESSURE: 147 MMHG

## 2020-06-17 VITALS — DIASTOLIC BLOOD PRESSURE: 79 MMHG | SYSTOLIC BLOOD PRESSURE: 140 MMHG

## 2020-06-17 VITALS — DIASTOLIC BLOOD PRESSURE: 76 MMHG | SYSTOLIC BLOOD PRESSURE: 135 MMHG

## 2020-06-17 VITALS — SYSTOLIC BLOOD PRESSURE: 140 MMHG | DIASTOLIC BLOOD PRESSURE: 69 MMHG

## 2020-06-17 VITALS — SYSTOLIC BLOOD PRESSURE: 115 MMHG | DIASTOLIC BLOOD PRESSURE: 53 MMHG

## 2020-06-17 VITALS — SYSTOLIC BLOOD PRESSURE: 135 MMHG | DIASTOLIC BLOOD PRESSURE: 89 MMHG

## 2020-06-17 VITALS — SYSTOLIC BLOOD PRESSURE: 147 MMHG | DIASTOLIC BLOOD PRESSURE: 79 MMHG

## 2020-06-17 LAB
BASOPHILS # BLD AUTO: 0.2 /CMM (ref 0–0.2)
BASOPHILS NFR BLD AUTO: 0.9 % (ref 0–2)
BUN SERPL-MCNC: 17 MG/DL (ref 7–18)
CALCIUM SERPL-MCNC: 7.7 MG/DL (ref 8.5–10.1)
CHLORIDE SERPL-SCNC: 114 MMOL/L (ref 98–107)
CO2 SERPL-SCNC: 28 MMOL/L (ref 21–32)
CREAT SERPL-MCNC: 0.6 MG/DL (ref 0.6–1.3)
EOSINOPHIL NFR BLD AUTO: 0.2 % (ref 0–6)
GLUCOSE SERPL-MCNC: 108 MG/DL (ref 74–106)
HCT VFR BLD AUTO: 27 % (ref 39–51)
HGB BLD-MCNC: 8 G/DL (ref 13.5–17.5)
LYMPHOCYTES NFR BLD AUTO: 0.8 /CMM (ref 0.8–4.8)
LYMPHOCYTES NFR BLD AUTO: 4.7 % (ref 20–44)
MCHC RBC AUTO-ENTMCNC: 30 G/DL (ref 31–36)
MCV RBC AUTO: 77 FL (ref 80–96)
MONOCYTES NFR BLD AUTO: 1 /CMM (ref 0.1–1.3)
MONOCYTES NFR BLD AUTO: 5.6 % (ref 2–12)
NEUTROPHILS # BLD AUTO: 15.8 /CMM (ref 1.8–8.9)
NEUTROPHILS NFR BLD AUTO: 88.6 % (ref 43–81)
PLATELET # BLD AUTO: 266 /CMM (ref 150–450)
POTASSIUM SERPL-SCNC: 3.3 MMOL/L (ref 3.5–5.1)
RBC # BLD AUTO: 3.43 MIL/UL (ref 4.5–6)
SODIUM SERPL-SCNC: 148 MMOL/L (ref 136–145)
WBC NRBC COR # BLD AUTO: 17.8 K/UL (ref 4.3–11)

## 2020-06-17 RX ADMIN — POTASSIUM CHLORIDE SCH MEQ: 1500 TABLET, EXTENDED RELEASE ORAL at 09:52

## 2020-06-17 RX ADMIN — DEXTROSE MONOHYDRATE SCH MLS/HR: 50 INJECTION, SOLUTION INTRAVENOUS at 08:42

## 2020-06-17 RX ADMIN — POTASSIUM CHLORIDE SCH MEQ: 1500 TABLET, EXTENDED RELEASE ORAL at 08:57

## 2020-06-17 RX ADMIN — PIPERACILLIN SODIUM AND TAZOBACTAM SODIUM SCH MLS/HR: .375; 3 INJECTION, POWDER, LYOPHILIZED, FOR SOLUTION INTRAVENOUS at 09:49

## 2020-06-17 RX ADMIN — HYDROCORTISONE SODIUM SUCCINATE SCH MG: 100 INJECTION, POWDER, FOR SOLUTION INTRAMUSCULAR; INTRAVASCULAR at 08:50

## 2020-06-17 RX ADMIN — DEXTROSE MONOHYDRATE SCH MLS/HR: 50 INJECTION, SOLUTION INTRAVENOUS at 19:35

## 2020-06-17 RX ADMIN — POTASSIUM CHLORIDE SCH MLS/HR: 200 INJECTION, SOLUTION INTRAVENOUS at 08:50

## 2020-06-17 RX ADMIN — HYDROGEN PEROXIDE SCH ML: 2.65 LIQUID TOPICAL at 20:26

## 2020-06-17 RX ADMIN — POTASSIUM CHLORIDE SCH MEQ: 1500 TABLET, EXTENDED RELEASE ORAL at 08:50

## 2020-06-17 RX ADMIN — POTASSIUM CHLORIDE SCH MLS/HR: 200 INJECTION, SOLUTION INTRAVENOUS at 09:51

## 2020-06-17 RX ADMIN — SODIUM CHLORIDE SCH MG: 9 INJECTION, SOLUTION INTRAVENOUS at 08:50

## 2020-06-17 RX ADMIN — PIPERACILLIN SODIUM AND TAZOBACTAM SODIUM SCH MLS/HR: .375; 3 INJECTION, POWDER, LYOPHILIZED, FOR SOLUTION INTRAVENOUS at 18:42

## 2020-06-17 RX ADMIN — SODIUM CHLORIDE SCH MG: 9 INJECTION, SOLUTION INTRAVENOUS at 20:25

## 2020-06-17 RX ADMIN — SODIUM HYPOCHLORITE SCH ML: 2.5 SOLUTION TOPICAL at 09:45

## 2020-06-17 RX ADMIN — HYDROGEN PEROXIDE SCH ML: 2.65 LIQUID TOPICAL at 09:49

## 2020-06-17 RX ADMIN — HYDROCORTISONE SODIUM SUCCINATE SCH MG: 100 INJECTION, POWDER, FOR SOLUTION INTRAMUSCULAR; INTRAVASCULAR at 20:25

## 2020-06-17 RX ADMIN — POTASSIUM CHLORIDE SCH MLS/HR: 200 INJECTION, SOLUTION INTRAVENOUS at 14:40

## 2020-06-17 RX ADMIN — PIPERACILLIN SODIUM AND TAZOBACTAM SODIUM SCH MLS/HR: .375; 3 INJECTION, POWDER, LYOPHILIZED, FOR SOLUTION INTRAVENOUS at 02:06

## 2020-06-17 RX ADMIN — POTASSIUM CHLORIDE SCH MEQ: 1500 TABLET, EXTENDED RELEASE ORAL at 10:38

## 2020-06-17 NOTE — NUR
RN CLOSING NOTES



TRANSFERRED PATIENT TO [facility] ROOM 315/1. ACLS PROTOCOL WAS FOLLOWED. ALL PATIENT NEEDS MET, 
SAFETY WAS MAINTAINED, REMAINED STABLE DURING TRANSFER, ENDORSED TO NINAY RN FOR ALMA.

## 2020-06-17 NOTE — NUR
M/S RN NOTES



PATIENT RECEIVED AWAKE IN BED, ALERT AND ORIENTED X3. NO RESPIRATORY DISTRESS, ON O2 AT 3LPM 
VIA NASAL CANULA. PATIENT WITH NO C/O PAIN AT THIS TIME. PATIENT'S SKIN WARM TO TOUCH, 
DRESSINGS INTACT AND DRY. PATIENT'S IV ACCESS SITES ON THE VIVIEN PICC LINE AND RFA #18G BOTH 
INTACT AND PATENT. PATIENT ON BILAT SOFT WRIST RESTRAINTS, REMOVED AND CHECKED FOR REDNESS 
AND CIRCULATION. REPOSITIONED PATIENT Q2HRS. F/C INTACT AND DRAINING YELLOW URINE. PATIENT 
WITH NO BELONGINGS. PATIENT'S NEEDS ATTENDED, BED ON LOWEST LOCKED POSITION, CALL LIGHT 
WITHIN REACH. WILL CONTINUE TO MONITOR.

## 2020-06-17 NOTE — NUR
RN OPENING NOTES



RECEIVED PATIENT AWAKE IN BED. A/O X2, S/P EXTUBATION ON 6/16. CURRENTLY ON 4L O2 VIA NC, 
TOLERATING WELL, NO SIGNS OF RESPIRATORY DISTRESS NOTED, SATURATION AT 98%. ON TELE MONITOR 
WITH SINUS RHYTHM NOTED. PATIENT IS NPO AT THE MOMENT, PENDING SWALLOW EVALUATION. CLEMENT 
CATHETER IS IN PLACE, INTACT, AND DRAINING URINE. PATIENT IS ON BILATERAL WRIST RESTRAINTS 
DUE TO BEING NON COMPLIANT WITH TREATMENT, ATTEMPTING TO GET OUT OF BED AND KEEPS REMOVING 
THE NASAL CANULA. RESTRAINTS CHECKED FREQUENTLY PER PROTOCOL. ALL PATIENT NEEDS ARE BEING 
MET.VIVIEN PICC LINEN IS INTACT, PATENT, AND FLUSHED WELL. CVP CATHETER IS IN PLACE, CVP NOTED 
AT 8 AT THE MOMENT. SAFETY IS BEING MAINTAINED, CALL LIGHT WITHIN REACH, WILL CONTINUE TO 
MONITOR CLOSELY.

## 2020-06-17 NOTE — NUR
M/S RN NOTES



PATIENT AWAKE IN NO RESPIRATORY DISTRESS, NO C/O PAIN AT THIS TIME. SKIN WARM TO TOUCH, IV 
ACCESS SITES INTACT AND PATENT. F/C INTACT AND DRAINING WELL. PATIENT ON BILAT SOFT WRIST 
RESTRAINTS. PATIENT'S NEEDS ATTENDED, BED ON LOWEST LOCKED POSITION, CALL LIGHT WITHIN 
REACH. WILL ENDORSE TO ONCOMING SHIFT.

## 2020-06-17 NOTE — NUR
RN OPENING NOTES NOTES



Received patient awake, with confusion noted on bed on O2 inhalation, saturating well. No 
complaints of pain at this time. With bilateral soft wrist restraints noted. Kept on bed 
clean, dry and comfortable. On fall and aspiration precautions. Will continue to monitor 
accordingly.

## 2020-06-18 VITALS — DIASTOLIC BLOOD PRESSURE: 64 MMHG | SYSTOLIC BLOOD PRESSURE: 114 MMHG

## 2020-06-18 VITALS — DIASTOLIC BLOOD PRESSURE: 64 MMHG | SYSTOLIC BLOOD PRESSURE: 119 MMHG

## 2020-06-18 VITALS — DIASTOLIC BLOOD PRESSURE: 73 MMHG | SYSTOLIC BLOOD PRESSURE: 134 MMHG

## 2020-06-18 LAB
ALBUMIN SERPL BCP-MCNC: 1.5 G/DL (ref 3.4–5)
ALP SERPL-CCNC: 66 U/L (ref 46–116)
ALT SERPL W P-5'-P-CCNC: 37 U/L (ref 12–78)
AST SERPL W P-5'-P-CCNC: 27 U/L (ref 15–37)
BASOPHILS # BLD AUTO: 0 /CMM (ref 0–0.2)
BASOPHILS NFR BLD AUTO: 0.2 % (ref 0–2)
BILIRUB SERPL-MCNC: 0.4 MG/DL (ref 0.2–1)
BUN SERPL-MCNC: 18 MG/DL (ref 7–18)
CALCIUM SERPL-MCNC: 7.7 MG/DL (ref 8.5–10.1)
CHLORIDE SERPL-SCNC: 104 MMOL/L (ref 98–107)
CO2 SERPL-SCNC: 33 MMOL/L (ref 21–32)
CREAT SERPL-MCNC: 0.8 MG/DL (ref 0.6–1.3)
EOSINOPHIL NFR BLD AUTO: 0.3 % (ref 0–6)
GLUCOSE SERPL-MCNC: 113 MG/DL (ref 74–106)
HCT VFR BLD AUTO: 28 % (ref 39–51)
HGB BLD-MCNC: 8.9 G/DL (ref 13.5–17.5)
LYMPHOCYTES NFR BLD AUTO: 1 /CMM (ref 0.8–4.8)
LYMPHOCYTES NFR BLD AUTO: 8.5 % (ref 20–44)
MAGNESIUM SERPL-MCNC: 1.8 MG/DL (ref 1.8–2.4)
MCHC RBC AUTO-ENTMCNC: 32 G/DL (ref 31–36)
MCV RBC AUTO: 75 FL (ref 80–96)
MONOCYTES NFR BLD AUTO: 0.8 /CMM (ref 0.1–1.3)
MONOCYTES NFR BLD AUTO: 6.8 % (ref 2–12)
NEUTROPHILS # BLD AUTO: 10.2 /CMM (ref 1.8–8.9)
NEUTROPHILS NFR BLD AUTO: 84.2 % (ref 43–81)
PHOSPHATE SERPL-MCNC: 2.4 MG/DL (ref 2.5–4.9)
PLATELET # BLD AUTO: 359 /CMM (ref 150–450)
POTASSIUM SERPL-SCNC: 3 MMOL/L (ref 3.5–5.1)
PROT SERPL-MCNC: 6.1 G/DL (ref 6.4–8.2)
RBC # BLD AUTO: 3.71 MIL/UL (ref 4.5–6)
SODIUM SERPL-SCNC: 140 MMOL/L (ref 136–145)
WBC NRBC COR # BLD AUTO: 12.1 K/UL (ref 4.3–11)

## 2020-06-18 RX ADMIN — PIPERACILLIN SODIUM AND TAZOBACTAM SODIUM SCH MLS/HR: .375; 3 INJECTION, POWDER, LYOPHILIZED, FOR SOLUTION INTRAVENOUS at 09:58

## 2020-06-18 RX ADMIN — DEXTROSE MONOHYDRATE SCH MLS/HR: 50 INJECTION, SOLUTION INTRAVENOUS at 22:08

## 2020-06-18 RX ADMIN — HYDROCORTISONE SODIUM SUCCINATE SCH MG: 100 INJECTION, POWDER, FOR SOLUTION INTRAMUSCULAR; INTRAVASCULAR at 22:10

## 2020-06-18 RX ADMIN — SODIUM CHLORIDE SCH MG: 9 INJECTION, SOLUTION INTRAVENOUS at 08:15

## 2020-06-18 RX ADMIN — PIPERACILLIN SODIUM AND TAZOBACTAM SODIUM SCH MLS/HR: .375; 3 INJECTION, POWDER, LYOPHILIZED, FOR SOLUTION INTRAVENOUS at 17:58

## 2020-06-18 RX ADMIN — HYDROGEN PEROXIDE SCH ML: 2.65 LIQUID TOPICAL at 08:15

## 2020-06-18 RX ADMIN — SODIUM CHLORIDE SCH MG: 9 INJECTION, SOLUTION INTRAVENOUS at 22:02

## 2020-06-18 RX ADMIN — HYDROCORTISONE SODIUM SUCCINATE SCH MG: 100 INJECTION, POWDER, FOR SOLUTION INTRAMUSCULAR; INTRAVASCULAR at 08:15

## 2020-06-18 RX ADMIN — DEXTROSE MONOHYDRATE SCH MLS/HR: 50 INJECTION, SOLUTION INTRAVENOUS at 08:14

## 2020-06-18 RX ADMIN — SODIUM HYPOCHLORITE SCH ML: 2.5 SOLUTION TOPICAL at 08:16

## 2020-06-18 RX ADMIN — HYDROGEN PEROXIDE SCH ML: 2.65 LIQUID TOPICAL at 22:02

## 2020-06-18 RX ADMIN — PIPERACILLIN SODIUM AND TAZOBACTAM SODIUM SCH MLS/HR: .375; 3 INJECTION, POWDER, LYOPHILIZED, FOR SOLUTION INTRAVENOUS at 01:28

## 2020-06-18 NOTE — NUR
MS RN CLOSING NOTE



PATIENT IN BED RESTING COMFORTABLY. PATIENT IN NO ACUTE DISTRESS. NO SOB NOTED. PATIENT 
BREATHING IS EVEN AND UNLABORED. PATIENT NOTED WITH BILATERAL SOFT WRIST RESTRAINTS, WITH 
GOOD CIRCULATION NOTED. PATIENT KEPT CLEAN, DRY AND COMFORTABLE THROUGHOUT SHIFT. WOUND CARE 
PROVIDED AS ORDERED. PATIENT SAFETY PRECAUTIONS IN PLACE. PATIENT BED ALARM IS ON. PATIENT 
BED IS LOCKED AND IN LOWEST POSITION. CALL LIGHT WITHIN REACH. WILL ENDORSE CARE TO PM SHIFT 
FOR ALMA.

## 2020-06-18 NOTE — NUR
RN CLOSING NOTES



Patient asleep, easily awaken. oN O2 inhalation, no SOB/respiratory distress noted. All 
nursing needs attended, due meds given as ordered.  Turned and reposition per protocol. Kept 
on bed clean, dry and comfortable. On fall and aspiration precautions. Endorsed.

## 2020-06-18 NOTE — NUR
LCSW conducted chart review and met with the pt bedside. Pt is alert and oriented x 2. Pt 
appears disheveled and unkempt. LCSW attempted to conduct an assessment, however pt is not 
cooperative, appears confused at times and is in restraints. LCSW consulted with pt's 
bedside RN Gaurav, who stated pt has been displaying this behavior on and off today. LCSW to 
attempt at a later time when pt is more alert and cooperative to participate in a meaningful 
conversation.

## 2020-06-18 NOTE — NUR
MS RN OPENING NOTE



PATIENT IN BED RESTING COMFORTABLY. PATIENT IN NO ACUTE DISTRESS. NO SOB NOTED. PATIENT 
BREATHING IS EVEN AND UNLABORED. PATIENT NOTED WITH BILATERAL SOFT WRIST RESTRAINTS, WITH 
GOOD CIRCULATION NOTED. PATIENT SAFETY PRECAUTIONS IN PLACE. PATIENT BED ALARM IS ON. 
PATIENT BED IS LOCKED AND IN LOWEST POSITION. CALL LIGHT WITHIN REACH. WILL CONTINUE TO 
MONITOR.

## 2020-06-19 VITALS — DIASTOLIC BLOOD PRESSURE: 56 MMHG | SYSTOLIC BLOOD PRESSURE: 108 MMHG

## 2020-06-19 VITALS — SYSTOLIC BLOOD PRESSURE: 142 MMHG | DIASTOLIC BLOOD PRESSURE: 70 MMHG

## 2020-06-19 VITALS — SYSTOLIC BLOOD PRESSURE: 108 MMHG | DIASTOLIC BLOOD PRESSURE: 56 MMHG

## 2020-06-19 LAB
BASOPHILS # BLD AUTO: 0 /CMM (ref 0–0.2)
BASOPHILS NFR BLD AUTO: 0.2 % (ref 0–2)
BUN SERPL-MCNC: 18 MG/DL (ref 7–18)
CALCIUM SERPL-MCNC: 7.5 MG/DL (ref 8.5–10.1)
CHLORIDE SERPL-SCNC: 106 MMOL/L (ref 98–107)
CO2 SERPL-SCNC: 30 MMOL/L (ref 21–32)
CREAT SERPL-MCNC: 0.7 MG/DL (ref 0.6–1.3)
EOSINOPHIL NFR BLD AUTO: 1.4 % (ref 0–6)
GLUCOSE SERPL-MCNC: 119 MG/DL (ref 74–106)
HCT VFR BLD AUTO: 29 % (ref 39–51)
HGB BLD-MCNC: 8.9 G/DL (ref 13.5–17.5)
LYMPHOCYTES NFR BLD AUTO: 1.4 /CMM (ref 0.8–4.8)
LYMPHOCYTES NFR BLD AUTO: 10.8 % (ref 20–44)
MCHC RBC AUTO-ENTMCNC: 31 G/DL (ref 31–36)
MCV RBC AUTO: 75 FL (ref 80–96)
MONOCYTES NFR BLD AUTO: 0.9 /CMM (ref 0.1–1.3)
MONOCYTES NFR BLD AUTO: 6.7 % (ref 2–12)
NEUTROPHILS # BLD AUTO: 10.7 /CMM (ref 1.8–8.9)
NEUTROPHILS NFR BLD AUTO: 80.9 % (ref 43–81)
PHOSPHATE SERPL-MCNC: 2.5 MG/DL (ref 2.5–4.9)
PLATELET # BLD AUTO: 364 /CMM (ref 150–450)
POTASSIUM SERPL-SCNC: 3.3 MMOL/L (ref 3.5–5.1)
RBC # BLD AUTO: 3.8 MIL/UL (ref 4.5–6)
SODIUM SERPL-SCNC: 139 MMOL/L (ref 136–145)
WBC NRBC COR # BLD AUTO: 13.3 K/UL (ref 4.3–11)

## 2020-06-19 PROCEDURE — 05H933Z INSERTION OF INFUSION DEVICE INTO RIGHT BRACHIAL VEIN, PERCUTANEOUS APPROACH: ICD-10-PCS | Performed by: NURSE PRACTITIONER

## 2020-06-19 RX ADMIN — SODIUM HYPOCHLORITE SCH ML: 2.5 SOLUTION TOPICAL at 12:58

## 2020-06-19 RX ADMIN — DEXTROSE MONOHYDRATE SCH MLS/HR: 50 INJECTION, SOLUTION INTRAVENOUS at 08:35

## 2020-06-19 RX ADMIN — HYDROGEN PEROXIDE SCH ML: 2.65 LIQUID TOPICAL at 13:00

## 2020-06-19 RX ADMIN — HYDROCORTISONE SODIUM SUCCINATE SCH MG: 100 INJECTION, POWDER, FOR SOLUTION INTRAMUSCULAR; INTRAVASCULAR at 20:59

## 2020-06-19 RX ADMIN — SODIUM CHLORIDE SCH MG: 9 INJECTION, SOLUTION INTRAVENOUS at 08:36

## 2020-06-19 RX ADMIN — Medication SCH MLS/HR: at 20:59

## 2020-06-19 RX ADMIN — PIPERACILLIN SODIUM AND TAZOBACTAM SODIUM SCH MLS/HR: .375; 3 INJECTION, POWDER, LYOPHILIZED, FOR SOLUTION INTRAVENOUS at 02:07

## 2020-06-19 RX ADMIN — HYDROGEN PEROXIDE SCH ML: 2.65 LIQUID TOPICAL at 20:58

## 2020-06-19 RX ADMIN — PIPERACILLIN SODIUM AND TAZOBACTAM SODIUM SCH MLS/HR: .375; 3 INJECTION, POWDER, LYOPHILIZED, FOR SOLUTION INTRAVENOUS at 09:54

## 2020-06-19 RX ADMIN — HYDROCORTISONE SODIUM SUCCINATE SCH MG: 100 INJECTION, POWDER, FOR SOLUTION INTRAMUSCULAR; INTRAVASCULAR at 08:36

## 2020-06-19 RX ADMIN — SODIUM CHLORIDE SCH MG: 9 INJECTION, SOLUTION INTRAVENOUS at 21:00

## 2020-06-19 RX ADMIN — PIPERACILLIN SODIUM AND TAZOBACTAM SODIUM SCH MLS/HR: .375; 3 INJECTION, POWDER, LYOPHILIZED, FOR SOLUTION INTRAVENOUS at 17:56

## 2020-06-19 NOTE — NUR
m/s lvn: notes



release and reposition conner soft wrist restraint for circulation. pt remains confused and 
disoriented. reality orientation provided prn. will continue to monitor.

## 2020-06-19 NOTE — NUR
m/s lvn: notes



pt holding his picc line tubing with tip intact. no bleeding to site. pt manage to take off 
his other restraint and about to remove his hilliard. reality orientation provided prn. am care 
rendered by staff. applied conner wrist restraint. cn made aware. picc line nurse to come and 
insert new one.

## 2020-06-19 NOTE — NUR
MS RN NOTES 



AWAITING PICC LINE INSERTION 2300, PRIOR TO ADMINISTRATION OF MERREM IV; WILL CONT TO 
MONITOR; CHARGE NURSE AWARE

## 2020-06-19 NOTE — NUR
RN CLOSING NOTES



The patient is resting in bed. VS WNL. no S/S of distress. No s/s of fever, O2 sat is 99% 2L 
of O2, NS. A/Ox3. VIVIEN picc flushing well, cleaned  and patent. The patient is verbal. FC 
draining well, produced 400ml. There multiple wounds on BLE, R tight, and sacrum redness. 
Wound care provided as ordered. All safety mechanism in place. Will endorse the next shift.

## 2020-06-19 NOTE — NUR
MS RN OPENING NOTES



RECEIVED PATIENT RESTING IN BED COMFORTABLY; A/OX2-3, CONFUSED; PATIENT ON 2LPM NC, 
TOLERATING WELL, BREATHING EVEN AND UNLABORED; NO SOB NOTED; PER AM SHIFT, PATIENT PULLED 
OUT PICC LINE; NEW MIDLINE VIVIEN #18 IN PLACE, INTACT AND PATENT; FLUSHING WELL, NO S/S OF 
REDNESS OR INFILTRATION NOTED; BILATERAL WRIST RESTRAINTS APPLIED; NO EVIDENCE OF SKIN 
BREAKDOWN; CLEMENT CATH IN PLACE WITH YELLOW OUTPUT; BED LOCKED IN LOW POSITION; SIDE RAILS 
X2; CALL LIGHT WITHIN REACH; WILL CONT TO MONITOR

## 2020-06-19 NOTE — NUR
m/s lvn: notes



breakfast served by cna and release one of his restraint. pt remains with confusion and 
disorientation to place and situation. reality orientation provided prn. will continue to 
monitor.

## 2020-06-19 NOTE — NUR
m/s lvn: notes



jason (picc line nurse) inserted midline to right upper arm, gauze #[id number], james. well.

## 2020-06-19 NOTE — NUR
MS RN NOTES



NO MECHANICAL PROPHYLAXIS D/T BLE WOUNDS; F/U WITH SIOMARA SEGURA NP REGARDING CHEMICAL 
PROPHYLAXIS; PER MD, START LOVENOX 40MG SQ DAILY IN AM; WILL CONT TO MONITOR

## 2020-06-19 NOTE — NUR
m/s lvn: notes



incontinent of bowel rendered. kept clean and dry. good pericare rendered. released and 
repositioned conner wrist restraint for adl's. will continue to monitor.

## 2020-06-20 VITALS — DIASTOLIC BLOOD PRESSURE: 75 MMHG | SYSTOLIC BLOOD PRESSURE: 122 MMHG

## 2020-06-20 VITALS — SYSTOLIC BLOOD PRESSURE: 137 MMHG | DIASTOLIC BLOOD PRESSURE: 75 MMHG

## 2020-06-20 VITALS — DIASTOLIC BLOOD PRESSURE: 73 MMHG | SYSTOLIC BLOOD PRESSURE: 132 MMHG

## 2020-06-20 LAB
BASOPHILS # BLD AUTO: 0.1 /CMM (ref 0–0.2)
BASOPHILS NFR BLD AUTO: 0.6 % (ref 0–2)
BUN SERPL-MCNC: 16 MG/DL (ref 7–18)
CALCIUM SERPL-MCNC: 7.4 MG/DL (ref 8.5–10.1)
CHLORIDE SERPL-SCNC: 104 MMOL/L (ref 98–107)
CO2 SERPL-SCNC: 30 MMOL/L (ref 21–32)
CREAT SERPL-MCNC: 0.7 MG/DL (ref 0.6–1.3)
EOSINOPHIL NFR BLD AUTO: 0.5 % (ref 0–6)
GLUCOSE SERPL-MCNC: 94 MG/DL (ref 74–106)
HCT VFR BLD AUTO: 29 % (ref 39–51)
HGB BLD-MCNC: 8.8 G/DL (ref 13.5–17.5)
LYMPHOCYTES NFR BLD AUTO: 1.7 /CMM (ref 0.8–4.8)
LYMPHOCYTES NFR BLD AUTO: 14.6 % (ref 20–44)
MCHC RBC AUTO-ENTMCNC: 31 G/DL (ref 31–36)
MCV RBC AUTO: 76 FL (ref 80–96)
MONOCYTES NFR BLD AUTO: 0.9 /CMM (ref 0.1–1.3)
MONOCYTES NFR BLD AUTO: 7.5 % (ref 2–12)
NEUTROPHILS # BLD AUTO: 9.1 /CMM (ref 1.8–8.9)
NEUTROPHILS NFR BLD AUTO: 76.8 % (ref 43–81)
PLATELET # BLD AUTO: 358 /CMM (ref 150–450)
POTASSIUM SERPL-SCNC: 3 MMOL/L (ref 3.5–5.1)
RBC # BLD AUTO: 3.74 MIL/UL (ref 4.5–6)
SODIUM SERPL-SCNC: 139 MMOL/L (ref 136–145)
WBC NRBC COR # BLD AUTO: 11.8 K/UL (ref 4.3–11)

## 2020-06-20 RX ADMIN — HYDROCORTISONE SODIUM SUCCINATE SCH MG: 100 INJECTION, POWDER, FOR SOLUTION INTRAMUSCULAR; INTRAVASCULAR at 21:29

## 2020-06-20 RX ADMIN — POTASSIUM CHLORIDE SCH MEQ: 1500 TABLET, EXTENDED RELEASE ORAL at 12:15

## 2020-06-20 RX ADMIN — HYDROGEN PEROXIDE SCH ML: 2.65 LIQUID TOPICAL at 09:42

## 2020-06-20 RX ADMIN — PIPERACILLIN SODIUM AND TAZOBACTAM SODIUM SCH MLS/HR: .375; 3 INJECTION, POWDER, LYOPHILIZED, FOR SOLUTION INTRAVENOUS at 17:39

## 2020-06-20 RX ADMIN — SODIUM CHLORIDE SCH MG: 9 INJECTION, SOLUTION INTRAVENOUS at 21:25

## 2020-06-20 RX ADMIN — SODIUM CHLORIDE SCH MG: 9 INJECTION, SOLUTION INTRAVENOUS at 09:42

## 2020-06-20 RX ADMIN — HYDROGEN PEROXIDE SCH ML: 2.65 LIQUID TOPICAL at 21:25

## 2020-06-20 RX ADMIN — PIPERACILLIN SODIUM AND TAZOBACTAM SODIUM SCH MLS/HR: .375; 3 INJECTION, POWDER, LYOPHILIZED, FOR SOLUTION INTRAVENOUS at 01:38

## 2020-06-20 RX ADMIN — POTASSIUM CHLORIDE SCH MEQ: 1500 TABLET, EXTENDED RELEASE ORAL at 09:42

## 2020-06-20 RX ADMIN — Medication SCH MLS/HR: at 09:42

## 2020-06-20 RX ADMIN — HYDROCORTISONE SODIUM SUCCINATE SCH MG: 100 INJECTION, POWDER, FOR SOLUTION INTRAMUSCULAR; INTRAVASCULAR at 09:50

## 2020-06-20 RX ADMIN — Medication SCH MLS/HR: at 21:24

## 2020-06-20 RX ADMIN — ENOXAPARIN SODIUM SCH MG: 40 INJECTION SUBCUTANEOUS at 09:51

## 2020-06-20 RX ADMIN — SODIUM HYPOCHLORITE SCH ML: 2.5 SOLUTION TOPICAL at 09:53

## 2020-06-20 RX ADMIN — PIPERACILLIN SODIUM AND TAZOBACTAM SODIUM SCH MLS/HR: .375; 3 INJECTION, POWDER, LYOPHILIZED, FOR SOLUTION INTRAVENOUS at 11:29

## 2020-06-20 RX ADMIN — POTASSIUM CHLORIDE SCH MEQ: 1500 TABLET, EXTENDED RELEASE ORAL at 10:52

## 2020-06-20 NOTE — NUR
Patient  resting in bed. Breathing unlabored and even on room air tolerating well. Midline 
intact and patent , flushed well. IV Zosyn running as ordered. All needs attended, safety 
measures implemented and call light within reach.

## 2020-06-20 NOTE — NUR
RN NOTES



RECEIVED PATIENT RESTING IN BED COMFORTABLY; A/OX2-3, CONFUSED; PATIENT ON 2LPM NC, 
TOLERATING WELL, BREATHING EVEN AND UNLABORED; NO SOB NOTED; NO ACUTE CARDIAC OR RESPIRATORY 
DISTRESS NOTED; MIDLINE VIVIEN #18 IN PLACE, INTACT AND PATENT; FLUSHING WELL, NO S/S OF 
REDNESS OR INFILTRATION NOTED; BILATERAL WRIST RESTRAINTS APPLIED; NO EVIDENCE OF SKIN 
BREAKDOWN; CLEMENT CATH IN PLACE WITH YELLOW OUTPUT; BED LOCKED IN LOW POSITION; SIDE RAILS 
X2; CALL LIGHT WITHIN REACH; ALL NEEDS ANTICIPATED. WILL CONTINUE TO MONITOR ACCORDINGLY.

## 2020-06-20 NOTE — NUR
MS VALDIVIA NOTES



REPORT GIVEN TO SKIP ROBBINS FOR ALMA 

-------------------------------------------------------------------------------

Addendum: 06/20/20 at 0101 by ITZEL ALLEN RN

-------------------------------------------------------------------------------

report recieved will assume care. cont to monitor.

## 2020-06-21 VITALS — SYSTOLIC BLOOD PRESSURE: 117 MMHG | DIASTOLIC BLOOD PRESSURE: 61 MMHG

## 2020-06-21 VITALS — SYSTOLIC BLOOD PRESSURE: 142 MMHG | DIASTOLIC BLOOD PRESSURE: 65 MMHG

## 2020-06-21 VITALS — DIASTOLIC BLOOD PRESSURE: 61 MMHG | SYSTOLIC BLOOD PRESSURE: 117 MMHG

## 2020-06-21 VITALS — DIASTOLIC BLOOD PRESSURE: 66 MMHG | SYSTOLIC BLOOD PRESSURE: 127 MMHG

## 2020-06-21 LAB
BASOPHILS # BLD AUTO: 0 /CMM (ref 0–0.2)
BASOPHILS NFR BLD AUTO: 0.3 % (ref 0–2)
BUN SERPL-MCNC: 12 MG/DL (ref 7–18)
CALCIUM SERPL-MCNC: 7.8 MG/DL (ref 8.5–10.1)
CHLORIDE SERPL-SCNC: 102 MMOL/L (ref 98–107)
CO2 SERPL-SCNC: 27 MMOL/L (ref 21–32)
CREAT SERPL-MCNC: 0.8 MG/DL (ref 0.6–1.3)
EOSINOPHIL NFR BLD AUTO: 0.6 % (ref 0–6)
GLUCOSE SERPL-MCNC: 111 MG/DL (ref 74–106)
HCT VFR BLD AUTO: 31 % (ref 39–51)
HGB BLD-MCNC: 9.4 G/DL (ref 13.5–17.5)
LYMPHOCYTES NFR BLD AUTO: 1.6 /CMM (ref 0.8–4.8)
LYMPHOCYTES NFR BLD AUTO: 11.7 % (ref 20–44)
MCHC RBC AUTO-ENTMCNC: 31 G/DL (ref 31–36)
MCV RBC AUTO: 77 FL (ref 80–96)
MONOCYTES NFR BLD AUTO: 0.8 /CMM (ref 0.1–1.3)
MONOCYTES NFR BLD AUTO: 5.9 % (ref 2–12)
NEUTROPHILS # BLD AUTO: 11.4 /CMM (ref 1.8–8.9)
NEUTROPHILS NFR BLD AUTO: 81.5 % (ref 43–81)
PLATELET # BLD AUTO: 368 /CMM (ref 150–450)
POTASSIUM SERPL-SCNC: 3.6 MMOL/L (ref 3.5–5.1)
RBC # BLD AUTO: 4 MIL/UL (ref 4.5–6)
SODIUM SERPL-SCNC: 136 MMOL/L (ref 136–145)
WBC NRBC COR # BLD AUTO: 14 K/UL (ref 4.3–11)

## 2020-06-21 RX ADMIN — SODIUM HYPOCHLORITE SCH ML: 2.5 SOLUTION TOPICAL at 08:56

## 2020-06-21 RX ADMIN — HYDROGEN PEROXIDE SCH ML: 2.65 LIQUID TOPICAL at 20:56

## 2020-06-21 RX ADMIN — HYDROGEN PEROXIDE SCH ML: 2.65 LIQUID TOPICAL at 08:57

## 2020-06-21 RX ADMIN — SODIUM CHLORIDE SCH MG: 9 INJECTION, SOLUTION INTRAVENOUS at 08:44

## 2020-06-21 RX ADMIN — PIPERACILLIN SODIUM AND TAZOBACTAM SODIUM SCH MLS/HR: .375; 3 INJECTION, POWDER, LYOPHILIZED, FOR SOLUTION INTRAVENOUS at 02:03

## 2020-06-21 RX ADMIN — HYDROCORTISONE SODIUM SUCCINATE SCH MG: 100 INJECTION, POWDER, FOR SOLUTION INTRAMUSCULAR; INTRAVASCULAR at 20:40

## 2020-06-21 RX ADMIN — Medication SCH MLS/HR: at 20:50

## 2020-06-21 RX ADMIN — ENOXAPARIN SODIUM SCH MG: 40 INJECTION SUBCUTANEOUS at 08:47

## 2020-06-21 RX ADMIN — Medication SCH MLS/HR: at 08:45

## 2020-06-21 RX ADMIN — SODIUM CHLORIDE SCH MG: 9 INJECTION, SOLUTION INTRAVENOUS at 20:40

## 2020-06-21 RX ADMIN — HYDROCORTISONE SODIUM SUCCINATE SCH MG: 100 INJECTION, POWDER, FOR SOLUTION INTRAMUSCULAR; INTRAVASCULAR at 08:44

## 2020-06-21 NOTE — NUR
Patient  resting in bed. Breathing unlabored and even on room air tolerating well. Midline 
intact and patent , flushing well. F/C intact draining urine yellow with sediment All needs 
attended, skin care done.Safety measures implemented and call light within reach.Awaiting 
for placement. Will endorse to next shift for ALMA

## 2020-06-21 NOTE — NUR
RN NOTES



ALL NEEDS ATTENDED AND MET, ABLE TO REST AND SLEPT AT INTERVALS, SAFETY MEASURES IN PLACE. 
ASPIRATION PRECAUTION EMPHASIZED. CALL LIGHT WITH IN EASY REACH. IV LINE INTACT AND PATENT 
REPOSITIONED, WILL ENDORSE TO AM NURSE FOR CONTINUITY OF CARE.

## 2020-06-21 NOTE — NUR
RN vinod notes

Wound care provided as ordered on BLE, R foot and L foot, R knee. Pictures are taken and 
placed at Pt's chart. Pt refused assessment and skin pictures on back, sacrum, R hip and 
groin. Offered multiple times. Made aware risks and benefits. Pt keep refusing. Will 
continue to monitor.

## 2020-06-21 NOTE — NUR
RN medsurg opening notes

Received Pt from morning nurse. Pt is alert and orientedX1-2. Pt is resting in bed 
comfortably. Respiration is normal. No SOB. No S/S of distress noted. VIVIEN midline is clean, 
intact, patent and SL. Trent cath is clean, intact and draining yellow urine with sediment. 
Safety precautions is maintained. Bed at low position, brakes locked, side rails upX2, bed 
alarm is on and call light is within reach. Will continue to monitor.

## 2020-06-22 VITALS — DIASTOLIC BLOOD PRESSURE: 63 MMHG | SYSTOLIC BLOOD PRESSURE: 129 MMHG

## 2020-06-22 VITALS — DIASTOLIC BLOOD PRESSURE: 72 MMHG | SYSTOLIC BLOOD PRESSURE: 127 MMHG

## 2020-06-22 VITALS — DIASTOLIC BLOOD PRESSURE: 75 MMHG | SYSTOLIC BLOOD PRESSURE: 129 MMHG

## 2020-06-22 LAB
BUN SERPL-MCNC: 13 MG/DL (ref 7–18)
CALCIUM SERPL-MCNC: 7.5 MG/DL (ref 8.5–10.1)
CHLORIDE SERPL-SCNC: 103 MMOL/L (ref 98–107)
CO2 SERPL-SCNC: 30 MMOL/L (ref 21–32)
CREAT SERPL-MCNC: 0.8 MG/DL (ref 0.6–1.3)
GLUCOSE SERPL-MCNC: 108 MG/DL (ref 74–106)
POTASSIUM SERPL-SCNC: 3.6 MMOL/L (ref 3.5–5.1)
SODIUM SERPL-SCNC: 137 MMOL/L (ref 136–145)

## 2020-06-22 RX ADMIN — HYDROGEN PEROXIDE SCH ML: 2.65 LIQUID TOPICAL at 20:32

## 2020-06-22 RX ADMIN — HYDROCORTISONE SODIUM SUCCINATE SCH MG: 100 INJECTION, POWDER, FOR SOLUTION INTRAMUSCULAR; INTRAVASCULAR at 20:32

## 2020-06-22 RX ADMIN — ENOXAPARIN SODIUM SCH MG: 40 INJECTION SUBCUTANEOUS at 08:29

## 2020-06-22 RX ADMIN — SODIUM HYPOCHLORITE SCH ML: 2.5 SOLUTION TOPICAL at 08:30

## 2020-06-22 RX ADMIN — HYDROGEN PEROXIDE SCH ML: 2.65 LIQUID TOPICAL at 08:30

## 2020-06-22 RX ADMIN — SODIUM CHLORIDE SCH MG: 9 INJECTION, SOLUTION INTRAVENOUS at 20:32

## 2020-06-22 RX ADMIN — Medication SCH MLS/HR: at 20:43

## 2020-06-22 RX ADMIN — HYDROCORTISONE SODIUM SUCCINATE SCH MG: 100 INJECTION, POWDER, FOR SOLUTION INTRAMUSCULAR; INTRAVASCULAR at 08:27

## 2020-06-22 RX ADMIN — SODIUM CHLORIDE SCH MG: 9 INJECTION, SOLUTION INTRAVENOUS at 08:27

## 2020-06-22 RX ADMIN — Medication SCH MLS/HR: at 08:32

## 2020-06-22 NOTE — NUR
MS RN CLOSING NOTES



PATIENT REMAINS IN BED, AWAKE, A/O X2. PATIENT ON AND OFF O2; BREATHING IS EVEN AND 
UNLABORED; NO SOB PRESENT AT THIS TIME. VIVIEN MIDLINE PRESENT AND INTACT. NO COMPLAINS OF PAIN 
THROUGHOUT THE DAY. ALL NEEDS ATTENDED TO. SAFETY PRECAUTIONS REMAIN IN PLACE; BED IN LOW 
POSITION AND LOCKED; RAILS UP X2, CALL LIGHT WITHIN REACH. WILL ENDORSE TO NIGHT SHIFT 
NURSE.

## 2020-06-22 NOTE — NUR
MS RN NOTES



PATIENT IN BED, AWAKE, ALERT AND ORIENTED X 2. BREATHING EVEN AND UNLABORED ON ROOM AIR. 
SHOWS NO SIGNS OF ACUTE RESPIRATORY DISTRESS. NO ACUTE PAIN. FC CLEAN DRY AND INTACT, 
FLOWING YELLOW URINE. VIVIEN MIDLINE CLEAN, DRY AND INTACT. SHOWS NO SIGNS OF INFILTRATION, NO 
REDNESS. SAFETY PRECAUTIONS IN PLACE. BED IN LOWEST POSITION, LOCKED, AND CALL LIGHT KEPT 
WITHIN REACH. WILL CONTINUE TO MONITOR.

## 2020-06-22 NOTE — NUR
MS RN OPENING NOTES



RECEIVED PATIENT IN BED, AWAKE, A/O X2. PATIENT ON AND OFF O2; BREATHING IS EVEN AND 
UNLABORED; NO SOB PRESENT AT THIS TIME. VIVIEN MIDLINE PRESENT AND INTACT. NO COMPLAINS OF PAIN 
AT THE MOMENT. SAFETY PRECAUTIONS IN PLACE; BED IN LOW POSITION AND LOCKED; RAILS UP X2, 
CALL LIGHT WITHIN REACH. WILL CONTINUE TO MONITOR PATIENT.

## 2020-06-22 NOTE — NUR
RN medsurg closing notes

Pt is alert and orientedX1-2. Pt is resting in bed comfortably. Respiration is normal in 2 L 
NC.  No SOB. No  S/S of distress noted. VS is stable. Afebrile. Routine meds were given as 
ordered. VIVIEN midline is clean, intact, patent and SL. Trent cath is clean, intact and 
draining yellow urine with sediment 1100ml. Wound care provided as ordered. Kept Pt clean, 
dry and comfortable. Safety precautions is maintained. Bed at low position, brakes locked, 
side rails upX2, bed alarm is on and call light is within reach. Will endorse to morning 
nurse for ALMA.

## 2020-06-23 VITALS — DIASTOLIC BLOOD PRESSURE: 67 MMHG | SYSTOLIC BLOOD PRESSURE: 123 MMHG

## 2020-06-23 VITALS — SYSTOLIC BLOOD PRESSURE: 128 MMHG | DIASTOLIC BLOOD PRESSURE: 68 MMHG

## 2020-06-23 VITALS — SYSTOLIC BLOOD PRESSURE: 139 MMHG | DIASTOLIC BLOOD PRESSURE: 83 MMHG

## 2020-06-23 LAB
BASOPHILS # BLD AUTO: 0 /CMM (ref 0–0.2)
BASOPHILS NFR BLD AUTO: 0.1 % (ref 0–2)
BUN SERPL-MCNC: 14 MG/DL (ref 7–18)
CALCIUM SERPL-MCNC: 7.4 MG/DL (ref 8.5–10.1)
CHLORIDE SERPL-SCNC: 104 MMOL/L (ref 98–107)
CO2 SERPL-SCNC: 34 MMOL/L (ref 21–32)
CREAT SERPL-MCNC: 0.7 MG/DL (ref 0.6–1.3)
EOSINOPHIL NFR BLD AUTO: 0.1 % (ref 0–6)
GLUCOSE SERPL-MCNC: 87 MG/DL (ref 74–106)
HCT VFR BLD AUTO: 30 % (ref 39–51)
HGB BLD-MCNC: 9.4 G/DL (ref 13.5–17.5)
LYMPHOCYTES NFR BLD AUTO: 1.3 /CMM (ref 0.8–4.8)
LYMPHOCYTES NFR BLD AUTO: 12.8 % (ref 20–44)
MCHC RBC AUTO-ENTMCNC: 31 G/DL (ref 31–36)
MCV RBC AUTO: 78 FL (ref 80–96)
MONOCYTES NFR BLD AUTO: 0.6 /CMM (ref 0.1–1.3)
MONOCYTES NFR BLD AUTO: 6.1 % (ref 2–12)
NEUTROPHILS # BLD AUTO: 8.4 /CMM (ref 1.8–8.9)
NEUTROPHILS NFR BLD AUTO: 80.9 % (ref 43–81)
PLATELET # BLD AUTO: 350 /CMM (ref 150–450)
POTASSIUM SERPL-SCNC: 3.6 MMOL/L (ref 3.5–5.1)
RBC # BLD AUTO: 3.88 MIL/UL (ref 4.5–6)
SODIUM SERPL-SCNC: 138 MMOL/L (ref 136–145)
WBC NRBC COR # BLD AUTO: 10.4 K/UL (ref 4.3–11)

## 2020-06-23 RX ADMIN — HYDROGEN PEROXIDE SCH ML: 2.65 LIQUID TOPICAL at 21:56

## 2020-06-23 RX ADMIN — SODIUM CHLORIDE SCH MG: 9 INJECTION, SOLUTION INTRAVENOUS at 08:36

## 2020-06-23 RX ADMIN — SODIUM HYPOCHLORITE SCH ML: 2.5 SOLUTION TOPICAL at 08:37

## 2020-06-23 RX ADMIN — HYDROCORTISONE SODIUM SUCCINATE SCH MG: 100 INJECTION, POWDER, FOR SOLUTION INTRAMUSCULAR; INTRAVASCULAR at 08:36

## 2020-06-23 RX ADMIN — Medication SCH MLS/HR: at 21:55

## 2020-06-23 RX ADMIN — Medication SCH MLS/HR: at 08:37

## 2020-06-23 RX ADMIN — HYDROGEN PEROXIDE SCH ML: 2.65 LIQUID TOPICAL at 08:38

## 2020-06-23 RX ADMIN — ENOXAPARIN SODIUM SCH MG: 40 INJECTION SUBCUTANEOUS at 08:37

## 2020-06-23 RX ADMIN — SODIUM CHLORIDE SCH MG: 9 INJECTION, SOLUTION INTRAVENOUS at 21:55

## 2020-06-23 NOTE — NUR
MS RN NOTES



PATIENT REFUSING WOUND TREATMENT AND BED CHANGE. ALSO REFUSED XR CHEST, ASK TO COME BACK 
LATER. WILL CONTINUE TO MONITOR.

## 2020-06-23 NOTE — NUR
MS RN OPENING NOTES



RECEIVED PATIENT IN BED, ASLEEP. PATIENT ON AND OFF O2; BREATHING IS EVEN AND UNLABORED; NO 
SOB PRESENT AT THIS TIME. VIVIEN MIDLINE PRESENT AND INTACT. NO S/S OF PAIN AT THE MOMENT SUCH 
AS FACIAL GRIMACING, MOANING OR GUARDING. SAFETY PRECAUTIONS IN PLACE; BED IN LOW POSITION 
AND LOCKED; RAILS UP X2, CALL LIGHT WITHIN REACH. WILL CONTINUE TO MONITOR PATIENT.

## 2020-06-23 NOTE — NUR
MS RN CLOSING NOTES



PATIENT REMAINS IN BED, AWAKE, A/O X3. PATIENT ON ROOM AIR; BREATHING IS EVEN AND UNLABORED; 
NO SOB PRESENT AT THIS TIME. VIVIEN MIDLINE PRESENT AND INTACT. NO COMPLAINS OF PAIN THROUGHOUT 
THE DAY. ALL NEEDS ATTENDED TO. WOUND CARE PROVIDED. SAFETY PRECAUTIONS REMAIN IN PLACE; BED 
IN LOW POSITION AND LOCKED; RAILS UP X2, CALL LIGHT WITHIN REACH. WILL ENDORSE TO NIGHT 
SHIFT NURSE.

## 2020-06-23 NOTE — NUR
MS RN PM OPENING NOTE



BEDSIDE REPORT RECIEVED FORM TETE SANDERSON RN. PATIENT IN BED, AWAKE, A/O X3. PATIENT ON ROOM 
AIR; BREATHING EVEN AND UNLABORED; DENIES SOB. VIVIEN MIDLINE PRESENT AND INTACT, FLUSHED 
PATENT. DENIES PAIN. SAFETY PRECAUTIONS REMAIN IN PLACE; BED IN LOW POSITION AND LOCKED; 
RAILS UP X2, CALL LIGHT WITHIN REACH. CALL LIGHT WITHIN REACH VERBALIZED UNDERSTANDING TO 
CALL FOR ASSISTANCE AS NEEDED.

## 2020-06-23 NOTE — NUR
MS RN NOTES



PATIENT IN BED, ASLEEP, ALERT AND ORIENTED X 2. BREATHING EVEN AND UNLABORED ON ROOM AIR. 
SHOWS NO SIGNS OF ACUTE RESPIRATORY DISTRESS. NO ACUTE PAIN. FC CLEAN DRY AND INTACT, 
FLOWING YELLOW URINE. VIVIEN MIDLINE CLEAN, DRY AND INTACT. SHOWS NO SIGNS OF INFILTRATION, NO 
REDNESS. ALL DUE MEDICATIONS GIVEN. SAFETY PRECAUTIONS IN PLACE. BED IN LOWEST POSITION, 
LOCKED, AND CALL LIGHT KEPT WITHIN REACH. WILL ENDORSE TO ONCOMING NURSE.

## 2020-06-24 VITALS — DIASTOLIC BLOOD PRESSURE: 84 MMHG | SYSTOLIC BLOOD PRESSURE: 125 MMHG

## 2020-06-24 LAB
BUN SERPL-MCNC: 13 MG/DL (ref 7–18)
CALCIUM SERPL-MCNC: 7.8 MG/DL (ref 8.5–10.1)
CHLORIDE SERPL-SCNC: 102 MMOL/L (ref 98–107)
CO2 SERPL-SCNC: 31 MMOL/L (ref 21–32)
CREAT SERPL-MCNC: 0.8 MG/DL (ref 0.6–1.3)
GLUCOSE SERPL-MCNC: 72 MG/DL (ref 74–106)
POTASSIUM SERPL-SCNC: 3.8 MMOL/L (ref 3.5–5.1)
SODIUM SERPL-SCNC: 137 MMOL/L (ref 136–145)

## 2020-06-24 RX ADMIN — HYDROGEN PEROXIDE SCH ML: 2.65 LIQUID TOPICAL at 08:33

## 2020-06-24 RX ADMIN — ENOXAPARIN SODIUM SCH MG: 40 INJECTION SUBCUTANEOUS at 08:35

## 2020-06-24 RX ADMIN — SODIUM HYPOCHLORITE SCH ML: 2.5 SOLUTION TOPICAL at 08:38

## 2020-06-24 RX ADMIN — SODIUM CHLORIDE SCH MG: 9 INJECTION, SOLUTION INTRAVENOUS at 08:38

## 2020-06-24 RX ADMIN — Medication SCH MLS/HR: at 09:12

## 2020-06-24 NOTE — NUR
rn notes



patient received on room air, no sob noted, somewhat confused.  hilliard catheter present and 
is draining.  Plan is to DC and is currently awaiting for placement.  bed at the lowest 
setting, call light within reach, side rails up x2.

## 2020-06-24 NOTE — NUR
PT REFUSED WOUND CARE. 



STATES, "SHE DID IT UP ALL GOOD EARLIER TODAY I DON'T FEEL LIKE HAVING IT CHANGED RIGHT NOW. 
ID RATHER NOT BE BOTHERED."

## 2020-06-24 NOTE — NUR
rn notes



patient getting transferred at this time, no sob noted, vital signs stable.  hilliard cath 
removed, photos taken of wound and is placed in the chart, BLE, right hip and both feet have 
wounds.  VIVIEN midline to stay with the patient.  Patient has no belongings per patient and 
his belongings list.